# Patient Record
Sex: FEMALE | Race: WHITE | NOT HISPANIC OR LATINO | Employment: OTHER | URBAN - METROPOLITAN AREA
[De-identification: names, ages, dates, MRNs, and addresses within clinical notes are randomized per-mention and may not be internally consistent; named-entity substitution may affect disease eponyms.]

---

## 2017-07-20 ENCOUNTER — APPOINTMENT (OUTPATIENT)
Dept: CT IMAGING | Facility: HOSPITAL | Age: 82
End: 2017-07-20

## 2017-07-20 ENCOUNTER — HOSPITAL ENCOUNTER (INPATIENT)
Facility: HOSPITAL | Age: 82
LOS: 4 days | Discharge: NURSING, REHAB OR LONG-TERM CARE | End: 2017-07-24
Attending: EMERGENCY MEDICINE | Admitting: INTERNAL MEDICINE

## 2017-07-20 ENCOUNTER — APPOINTMENT (OUTPATIENT)
Dept: GENERAL RADIOLOGY | Facility: HOSPITAL | Age: 82
End: 2017-07-20

## 2017-07-20 DIAGNOSIS — R11.2 NAUSEA AND VOMITING, INTRACTABILITY OF VOMITING NOT SPECIFIED, UNSPECIFIED VOMITING TYPE: Primary | ICD-10-CM

## 2017-07-20 DIAGNOSIS — R26.2 DIFFICULTY WALKING: ICD-10-CM

## 2017-07-20 DIAGNOSIS — K52.9 COLITIS: ICD-10-CM

## 2017-07-20 LAB
ALBUMIN SERPL-MCNC: 4.5 G/DL (ref 3.5–5.2)
ALBUMIN/GLOB SERPL: 1.4 G/DL
ALP SERPL-CCNC: 89 U/L (ref 39–117)
ALT SERPL W P-5'-P-CCNC: 14 U/L (ref 1–33)
ANION GAP SERPL CALCULATED.3IONS-SCNC: 14.5 MMOL/L
AST SERPL-CCNC: 17 U/L (ref 1–32)
BACTERIA UR QL AUTO: ABNORMAL /HPF
BASOPHILS # BLD AUTO: 0.01 10*3/MM3 (ref 0–0.2)
BASOPHILS NFR BLD AUTO: 0.1 % (ref 0–1.5)
BILIRUB SERPL-MCNC: 0.6 MG/DL (ref 0.1–1.2)
BILIRUB UR QL STRIP: NEGATIVE
BUN BLD-MCNC: 17 MG/DL (ref 8–23)
BUN/CREAT SERPL: 20.5 (ref 7–25)
CALCIUM SPEC-SCNC: 9.6 MG/DL (ref 8.2–9.6)
CHLORIDE SERPL-SCNC: 93 MMOL/L (ref 98–107)
CLARITY UR: CLEAR
CO2 SERPL-SCNC: 30.5 MMOL/L (ref 22–29)
COLOR UR: YELLOW
CREAT BLD-MCNC: 0.83 MG/DL (ref 0.57–1)
D-LACTATE SERPL-SCNC: 1.7 MMOL/L (ref 0.5–2)
DEPRECATED RDW RBC AUTO: 46.1 FL (ref 37–54)
EOSINOPHIL # BLD AUTO: 0.01 10*3/MM3 (ref 0–0.7)
EOSINOPHIL NFR BLD AUTO: 0.1 % (ref 0.3–6.2)
ERYTHROCYTE [DISTWIDTH] IN BLOOD BY AUTOMATED COUNT: 13.3 % (ref 11.7–13)
GFR SERPL CREATININE-BSD FRML MDRD: 64 ML/MIN/1.73
GLOBULIN UR ELPH-MCNC: 3.3 GM/DL
GLUCOSE BLD-MCNC: 195 MG/DL (ref 65–99)
GLUCOSE UR STRIP-MCNC: NEGATIVE MG/DL
HCT VFR BLD AUTO: 44.8 % (ref 35.6–45.5)
HGB BLD-MCNC: 15.1 G/DL (ref 11.9–15.5)
HGB UR QL STRIP.AUTO: NEGATIVE
HOLD SPECIMEN: NORMAL
HOLD SPECIMEN: NORMAL
HYALINE CASTS UR QL AUTO: ABNORMAL /LPF
IMM GRANULOCYTES # BLD: 0.02 10*3/MM3 (ref 0–0.03)
IMM GRANULOCYTES NFR BLD: 0.2 % (ref 0–0.5)
KETONES UR QL STRIP: ABNORMAL
LEUKOCYTE ESTERASE UR QL STRIP.AUTO: ABNORMAL
LYMPHOCYTES # BLD AUTO: 0.5 10*3/MM3 (ref 0.9–4.8)
LYMPHOCYTES NFR BLD AUTO: 4.2 % (ref 19.6–45.3)
MCH RBC QN AUTO: 32 PG (ref 26.9–32)
MCHC RBC AUTO-ENTMCNC: 33.7 G/DL (ref 32.4–36.3)
MCV RBC AUTO: 94.9 FL (ref 80.5–98.2)
MONOCYTES # BLD AUTO: 0.38 10*3/MM3 (ref 0.2–1.2)
MONOCYTES NFR BLD AUTO: 3.2 % (ref 5–12)
NEUTROPHILS # BLD AUTO: 11.12 10*3/MM3 (ref 1.9–8.1)
NEUTROPHILS NFR BLD AUTO: 92.2 % (ref 42.7–76)
NITRITE UR QL STRIP: NEGATIVE
PH UR STRIP.AUTO: 5.5 [PH] (ref 5–8)
PLATELET # BLD AUTO: 186 10*3/MM3 (ref 140–500)
PMV BLD AUTO: 10.1 FL (ref 6–12)
POTASSIUM BLD-SCNC: 3.3 MMOL/L (ref 3.5–5.2)
PROT SERPL-MCNC: 7.8 G/DL (ref 6–8.5)
PROT UR QL STRIP: NEGATIVE
RBC # BLD AUTO: 4.72 10*6/MM3 (ref 3.9–5.2)
RBC # UR: ABNORMAL /HPF
REF LAB TEST METHOD: ABNORMAL
SODIUM BLD-SCNC: 138 MMOL/L (ref 136–145)
SP GR UR STRIP: 1.02 (ref 1–1.03)
SQUAMOUS #/AREA URNS HPF: ABNORMAL /HPF
UROBILINOGEN UR QL STRIP: ABNORMAL
WBC NRBC COR # BLD: 12.04 10*3/MM3 (ref 4.5–10.7)
WBC UR QL AUTO: ABNORMAL /HPF
WHOLE BLOOD HOLD SPECIMEN: NORMAL
WHOLE BLOOD HOLD SPECIMEN: NORMAL

## 2017-07-20 PROCEDURE — 80053 COMPREHEN METABOLIC PANEL: CPT | Performed by: EMERGENCY MEDICINE

## 2017-07-20 PROCEDURE — 87086 URINE CULTURE/COLONY COUNT: CPT | Performed by: EMERGENCY MEDICINE

## 2017-07-20 PROCEDURE — 71020 HC CHEST PA AND LATERAL: CPT

## 2017-07-20 PROCEDURE — 85025 COMPLETE CBC W/AUTO DIFF WBC: CPT | Performed by: EMERGENCY MEDICINE

## 2017-07-20 PROCEDURE — 87040 BLOOD CULTURE FOR BACTERIA: CPT | Performed by: EMERGENCY MEDICINE

## 2017-07-20 PROCEDURE — 81001 URINALYSIS AUTO W/SCOPE: CPT | Performed by: EMERGENCY MEDICINE

## 2017-07-20 PROCEDURE — 0 IOPAMIDOL 61 % SOLUTION: Performed by: EMERGENCY MEDICINE

## 2017-07-20 PROCEDURE — 99285 EMERGENCY DEPT VISIT HI MDM: CPT

## 2017-07-20 PROCEDURE — 83605 ASSAY OF LACTIC ACID: CPT | Performed by: EMERGENCY MEDICINE

## 2017-07-20 PROCEDURE — 74177 CT ABD & PELVIS W/CONTRAST: CPT

## 2017-07-20 RX ORDER — POTASSIUM CHLORIDE 7.45 MG/ML
10 INJECTION INTRAVENOUS
Status: DISCONTINUED | OUTPATIENT
Start: 2017-07-20 | End: 2017-07-24 | Stop reason: HOSPADM

## 2017-07-20 RX ORDER — SODIUM CHLORIDE 0.9 % (FLUSH) 0.9 %
10 SYRINGE (ML) INJECTION AS NEEDED
Status: DISCONTINUED | OUTPATIENT
Start: 2017-07-20 | End: 2017-07-21

## 2017-07-20 RX ORDER — LEVOFLOXACIN 5 MG/ML
500 INJECTION, SOLUTION INTRAVENOUS ONCE
Status: DISCONTINUED | OUTPATIENT
Start: 2017-07-20 | End: 2017-07-21 | Stop reason: DRUGHIGH

## 2017-07-20 RX ORDER — POTASSIUM CHLORIDE 750 MG/1
40 CAPSULE, EXTENDED RELEASE ORAL AS NEEDED
Status: DISCONTINUED | OUTPATIENT
Start: 2017-07-20 | End: 2017-07-24 | Stop reason: HOSPADM

## 2017-07-20 RX ORDER — POTASSIUM CHLORIDE 1.5 G/1.77G
40 POWDER, FOR SOLUTION ORAL AS NEEDED
Status: DISCONTINUED | OUTPATIENT
Start: 2017-07-20 | End: 2017-07-24 | Stop reason: HOSPADM

## 2017-07-20 RX ADMIN — POTASSIUM CHLORIDE 40 MEQ: 1.5 POWDER, FOR SOLUTION ORAL at 23:23

## 2017-07-20 RX ADMIN — METRONIDAZOLE 500 MG: 500 INJECTION, SOLUTION INTRAVENOUS at 23:26

## 2017-07-20 RX ADMIN — IOPAMIDOL 85 ML: 612 INJECTION, SOLUTION INTRAVENOUS at 22:01

## 2017-07-21 PROBLEM — E43 SEVERE MALNUTRITION (HCC): Status: ACTIVE | Noted: 2017-07-21

## 2017-07-21 PROBLEM — R63.6 UNDERWEIGHT: Status: ACTIVE | Noted: 2017-07-21

## 2017-07-21 LAB
ANION GAP SERPL CALCULATED.3IONS-SCNC: 14.2 MMOL/L
BASOPHILS # BLD AUTO: 0.01 10*3/MM3 (ref 0–0.2)
BASOPHILS NFR BLD AUTO: 0.1 % (ref 0–1.5)
BUN BLD-MCNC: 17 MG/DL (ref 8–23)
BUN/CREAT SERPL: 20.7 (ref 7–25)
CALCIUM SPEC-SCNC: 9.2 MG/DL (ref 8.2–9.6)
CHLORIDE SERPL-SCNC: 97 MMOL/L (ref 98–107)
CO2 SERPL-SCNC: 23.8 MMOL/L (ref 22–29)
CREAT BLD-MCNC: 0.82 MG/DL (ref 0.57–1)
DEPRECATED RDW RBC AUTO: 47.1 FL (ref 37–54)
EOSINOPHIL # BLD AUTO: 0 10*3/MM3 (ref 0–0.7)
EOSINOPHIL NFR BLD AUTO: 0 % (ref 0.3–6.2)
ERYTHROCYTE [DISTWIDTH] IN BLOOD BY AUTOMATED COUNT: 13.7 % (ref 11.7–13)
GFR SERPL CREATININE-BSD FRML MDRD: 65 ML/MIN/1.73
GLUCOSE BLD-MCNC: 122 MG/DL (ref 65–99)
HCT VFR BLD AUTO: 40.1 % (ref 35.6–45.5)
HGB BLD-MCNC: 13.2 G/DL (ref 11.9–15.5)
IMM GRANULOCYTES # BLD: 0.02 10*3/MM3 (ref 0–0.03)
IMM GRANULOCYTES NFR BLD: 0.2 % (ref 0–0.5)
LYMPHOCYTES # BLD AUTO: 0.88 10*3/MM3 (ref 0.9–4.8)
LYMPHOCYTES NFR BLD AUTO: 9.8 % (ref 19.6–45.3)
MCH RBC QN AUTO: 31.1 PG (ref 26.9–32)
MCHC RBC AUTO-ENTMCNC: 32.9 G/DL (ref 32.4–36.3)
MCV RBC AUTO: 94.6 FL (ref 80.5–98.2)
MONOCYTES # BLD AUTO: 0.72 10*3/MM3 (ref 0.2–1.2)
MONOCYTES NFR BLD AUTO: 8 % (ref 5–12)
NEUTROPHILS # BLD AUTO: 7.39 10*3/MM3 (ref 1.9–8.1)
NEUTROPHILS NFR BLD AUTO: 81.9 % (ref 42.7–76)
PLATELET # BLD AUTO: 216 10*3/MM3 (ref 140–500)
PMV BLD AUTO: 9.6 FL (ref 6–12)
POTASSIUM BLD-SCNC: 4.5 MMOL/L (ref 3.5–5.2)
POTASSIUM BLD-SCNC: 4.5 MMOL/L (ref 3.5–5.2)
RBC # BLD AUTO: 4.24 10*6/MM3 (ref 3.9–5.2)
SODIUM BLD-SCNC: 135 MMOL/L (ref 136–145)
WBC NRBC COR # BLD: 9.02 10*3/MM3 (ref 4.5–10.7)

## 2017-07-21 PROCEDURE — 25010000002 ONDANSETRON PER 1 MG: Performed by: INTERNAL MEDICINE

## 2017-07-21 PROCEDURE — 84132 ASSAY OF SERUM POTASSIUM: CPT | Performed by: INTERNAL MEDICINE

## 2017-07-21 PROCEDURE — 87040 BLOOD CULTURE FOR BACTERIA: CPT | Performed by: EMERGENCY MEDICINE

## 2017-07-21 PROCEDURE — 25010000002 HYDRALAZINE PER 20 MG: Performed by: INTERNAL MEDICINE

## 2017-07-21 PROCEDURE — 93010 ELECTROCARDIOGRAM REPORT: CPT | Performed by: INTERNAL MEDICINE

## 2017-07-21 PROCEDURE — 25010000002 PROMETHAZINE PER 50 MG: Performed by: HOSPITALIST

## 2017-07-21 PROCEDURE — 25010000002 LEVOFLOXACIN PER 250 MG: Performed by: INTERNAL MEDICINE

## 2017-07-21 PROCEDURE — 80048 BASIC METABOLIC PNL TOTAL CA: CPT | Performed by: INTERNAL MEDICINE

## 2017-07-21 PROCEDURE — 93005 ELECTROCARDIOGRAM TRACING: CPT | Performed by: HOSPITALIST

## 2017-07-21 PROCEDURE — 85025 COMPLETE CBC W/AUTO DIFF WBC: CPT | Performed by: INTERNAL MEDICINE

## 2017-07-21 RX ORDER — SODIUM CHLORIDE 0.9 % (FLUSH) 0.9 %
1-10 SYRINGE (ML) INJECTION AS NEEDED
Status: DISCONTINUED | OUTPATIENT
Start: 2017-07-21 | End: 2017-07-21

## 2017-07-21 RX ORDER — ONDANSETRON 4 MG/1
4 TABLET, FILM COATED ORAL EVERY 6 HOURS PRN
Status: DISCONTINUED | OUTPATIENT
Start: 2017-07-21 | End: 2017-07-23

## 2017-07-21 RX ORDER — ATENOLOL 25 MG/1
25 TABLET ORAL 2 TIMES DAILY
COMMUNITY
End: 2017-07-24 | Stop reason: HOSPADM

## 2017-07-21 RX ORDER — DONEPEZIL HYDROCHLORIDE 5 MG/1
5 TABLET, FILM COATED ORAL NIGHTLY
Status: DISCONTINUED | OUTPATIENT
Start: 2017-07-21 | End: 2017-07-24 | Stop reason: HOSPADM

## 2017-07-21 RX ORDER — SODIUM CHLORIDE 9 MG/ML
100 INJECTION, SOLUTION INTRAVENOUS CONTINUOUS
Status: DISCONTINUED | OUTPATIENT
Start: 2017-07-21 | End: 2017-07-22

## 2017-07-21 RX ORDER — ASPIRIN 81 MG/1
81 TABLET, CHEWABLE ORAL DAILY PRN
COMMUNITY

## 2017-07-21 RX ORDER — ISOSORBIDE MONONITRATE 30 MG/1
30 TABLET, EXTENDED RELEASE ORAL DAILY
Status: DISCONTINUED | OUTPATIENT
Start: 2017-07-21 | End: 2017-07-24 | Stop reason: HOSPADM

## 2017-07-21 RX ORDER — ONDANSETRON 4 MG/1
4 TABLET, ORALLY DISINTEGRATING ORAL EVERY 6 HOURS PRN
Status: DISCONTINUED | OUTPATIENT
Start: 2017-07-21 | End: 2017-07-23

## 2017-07-21 RX ORDER — LEVOFLOXACIN 5 MG/ML
750 INJECTION, SOLUTION INTRAVENOUS EVERY 24 HOURS
Status: DISCONTINUED | OUTPATIENT
Start: 2017-07-21 | End: 2017-07-21

## 2017-07-21 RX ORDER — ATORVASTATIN CALCIUM 10 MG/1
10 TABLET, FILM COATED ORAL DAILY
Status: DISCONTINUED | OUTPATIENT
Start: 2017-07-21 | End: 2017-07-21

## 2017-07-21 RX ORDER — LEVOFLOXACIN 5 MG/ML
750 INJECTION, SOLUTION INTRAVENOUS
Status: DISCONTINUED | OUTPATIENT
Start: 2017-07-23 | End: 2017-07-22

## 2017-07-21 RX ORDER — ATENOLOL 25 MG/1
25 TABLET ORAL 2 TIMES DAILY
Status: DISCONTINUED | OUTPATIENT
Start: 2017-07-21 | End: 2017-07-24

## 2017-07-21 RX ORDER — DONEPEZIL HYDROCHLORIDE 5 MG/1
5 TABLET, FILM COATED ORAL NIGHTLY
COMMUNITY

## 2017-07-21 RX ORDER — PROMETHAZINE HYDROCHLORIDE 25 MG/ML
6.25 INJECTION, SOLUTION INTRAMUSCULAR; INTRAVENOUS ONCE
Status: COMPLETED | OUTPATIENT
Start: 2017-07-21 | End: 2017-07-21

## 2017-07-21 RX ORDER — ISOSORBIDE MONONITRATE 30 MG/1
30 TABLET, EXTENDED RELEASE ORAL DAILY
COMMUNITY

## 2017-07-21 RX ORDER — ONDANSETRON 2 MG/ML
4 INJECTION INTRAMUSCULAR; INTRAVENOUS EVERY 6 HOURS PRN
Status: DISCONTINUED | OUTPATIENT
Start: 2017-07-21 | End: 2017-07-23

## 2017-07-21 RX ORDER — HYDRALAZINE HYDROCHLORIDE 20 MG/ML
10 INJECTION INTRAMUSCULAR; INTRAVENOUS EVERY 6 HOURS PRN
Status: DISCONTINUED | OUTPATIENT
Start: 2017-07-21 | End: 2017-07-24 | Stop reason: HOSPADM

## 2017-07-21 RX ADMIN — SODIUM CHLORIDE 100 ML/HR: 9 INJECTION, SOLUTION INTRAVENOUS at 01:00

## 2017-07-21 RX ADMIN — PROMETHAZINE HYDROCHLORIDE 6.25 MG: 25 INJECTION INTRAMUSCULAR; INTRAVENOUS at 11:44

## 2017-07-21 RX ADMIN — DONEPEZIL HYDROCHLORIDE 5 MG: 5 TABLET, FILM COATED ORAL at 20:56

## 2017-07-21 RX ADMIN — ONDANSETRON 4 MG: 2 INJECTION INTRAMUSCULAR; INTRAVENOUS at 03:26

## 2017-07-21 RX ADMIN — POTASSIUM CHLORIDE 40 MEQ: 1.5 POWDER, FOR SOLUTION ORAL at 04:15

## 2017-07-21 RX ADMIN — SODIUM CHLORIDE 100 ML/HR: 9 INJECTION, SOLUTION INTRAVENOUS at 14:24

## 2017-07-21 RX ADMIN — LEVOFLOXACIN 750 MG: 5 INJECTION, SOLUTION INTRAVENOUS at 02:23

## 2017-07-21 RX ADMIN — METRONIDAZOLE 500 MG: 500 INJECTION, SOLUTION INTRAVENOUS at 06:07

## 2017-07-21 RX ADMIN — HYDRALAZINE HYDROCHLORIDE 10 MG: 20 INJECTION INTRAMUSCULAR; INTRAVENOUS at 04:45

## 2017-07-21 RX ADMIN — ONDANSETRON 4 MG: 2 INJECTION INTRAMUSCULAR; INTRAVENOUS at 08:36

## 2017-07-21 RX ADMIN — APIXABAN 2.5 MG: 2.5 TABLET, FILM COATED ORAL at 20:56

## 2017-07-21 RX ADMIN — METRONIDAZOLE 500 MG: 500 INJECTION, SOLUTION INTRAVENOUS at 14:24

## 2017-07-21 RX ADMIN — METRONIDAZOLE 500 MG: 500 INJECTION, SOLUTION INTRAVENOUS at 22:29

## 2017-07-21 NOTE — PROGRESS NOTES
Continued Stay Note  [facility]     Patient Name: Audrey Roblero  MRN: [id number]  Today's Date: 7/21/2017    Admit Date: 7/20/2017          Discharge Plan       07/21/17 1408    Case Management/Social Work Plan    Additional Comments Attempted to call Yanira Baxter, 1st emergency contact and dtr, Becky Bernstein and left messages for both.  Explained IMM on message and explained that I will leave a copy in the room.  Rell, RN, CCP              Discharge Codes     None            Denise Maldonado, RN

## 2017-07-21 NOTE — PROGRESS NOTES
Name: Audrey Roblero ADMIT: 2017   : 6/3/1920  PCP: Rob Torre Jr., MD    MRN: [id number] LOS: 1 days   AGE/SEX: 89 y.o. female  ROOM: [facility]     Subjective   Subjective  Sickly appearing.  Pleasantly confused.  Dry heaves earlier.  Not eating much.    Objective   Vital Signs  Temp:  [94.9 °F (34.9 °C)-98 °F (36.7 °C)] 98 °F (36.7 °C)  Heart Rate:  [51-91] 82  Resp:  [14-20] 14  BP: (117-213)/(60-88) 117/80  Body mass index is 15.72 kg/(m^2).    Physical Exam   Constitutional: She is cooperative. She appears ill. No distress.   Elderly, frail, poor muscle mass   Neck: No JVD present. No tracheal deviation present. No thyromegaly present.   Cardiovascular: Normal rate and regular rhythm.    No murmur heard.  Pulmonary/Chest: Effort normal and breath sounds normal. No respiratory distress.   Abdominal: Soft. Normal appearance and bowel sounds are normal. She exhibits no distension. There is no tenderness (mild diffuse). There is no rebound and no guarding.   Neurological: She is alert.   Pleasantly confused   Skin: Skin is warm and dry. No rash noted.   Nursing note and vitals reviewed.      Results Review:       I reviewed the patient's new clinical results.    Results from last 7 days  Lab Units 17  0819 17  211   WBC 10*3/mm3 9.02 12.04*   HEMOGLOBIN g/dL 13.2 15.1   PLATELETS 10*3/mm3 216 186       Results from last 7 days  Lab Units 17  0819 17  2115   SODIUM mmol/L 135* 138   POTASSIUM mmol/L 4.5  4.5 3.3*   CHLORIDE mmol/L 97* 93*   CO2 mmol/L 23.8 30.5*   BUN mg/dL 17 17   CREATININE mg/dL 0.82 0.83   GLUCOSE mg/dL 122* 195*   Estimated Creatinine Clearance: 30.8 mL/min (by C-G formula based on Cr of 0.82).    Results from last 7 days  Lab Units 17  0819 17  211   CALCIUM mg/dL 9.2 9.6   ALBUMIN g/dL  --  4.50       Results from last 7 days  Lab Units 17  2115   LACTATE mmol/L 1.7       Results from last 7 days  Lab Units 17  0056  07/20/17  2115   BLOODCX  No growth at less than 24 hours No growth at less than 24 hours           CT SCAN ABDOMEN AND PELVIS  7/20/2017  1.  Findings of moderately severe colitis of the ascending colon. No  obstruction, perforation or abscess.  2.  Chronic changes of the sigmoid colon suggesting prior episodes of  inflammation. Diverticulosis of the colon.   3.  Fatty infiltration of the liver. Renal cysts measuring up to 1 cm.      apixaban 2.5 mg Oral Q12H   atenolol 25 mg Oral BID   atorvastatin 10 mg Oral Daily   donepezil 5 mg Oral Nightly   isosorbide mononitrate 30 mg Oral Daily   [START ON 7/23/2017] levoFLOXacin 750 mg Intravenous Q48H   metroNIDAZOLE 500 mg Intravenous Q8H       sodium chloride 100 mL/hr Last Rate: 100 mL/hr (07/21/17 1424)         Assessment/Plan   Assessment:     Active Hospital Problems (** Indicates Principal Problem)    Diagnosis Date Noted   • **Colitis [K52.9] 07/20/2017   • Underweight BMI 15.7 [R63.6] 07/21/2017   • Nausea and vomiting [R11.2] 07/20/2017   • Hypokalemia [E87.6] 11/01/2016   • Atrial fibrillation [I48.91] 10/31/2016   • Chronic diastolic heart failure [I50.32] 10/31/2016      Resolved Hospital Problems    Diagnosis Date Noted Date Resolved   No resolved problems to display.       Plan:   - LEVAQUIN and FLAGYL IV to cover GNRs and anaerobes  - NS @ 100 cc/hr  - Monitor QT  - Nausea control with IV ZOFRAN as needed.  - Monitor vital signs and pain  - DNR      Phi Terrazas MD  [facility]  07/21/17  2:35 PM

## 2017-07-21 NOTE — PLAN OF CARE
Problem: Patient Care Overview (Adult)  Goal: Plan of Care Review  Outcome: Ongoing (interventions implemented as appropriate)    07/21/17 1751   Coping/Psychosocial Response Interventions   Plan Of Care Reviewed With patient;daughter;caregiver   Outcome Evaluation   Outcome Summary/Follow up Plan VSS. no acute events. ongoing nausea (no relief w/ zofran); relief w/ phenergan and was able to eat l10% dinner. no stool sample attained to cx; paroxysmal afib continues on tele. new IV . NS @ 100ml/hr. update given to daughter Becky and caregiver Lily.          Problem: Fall Risk (Adult)  Goal: Absence of Falls  Outcome: Ongoing (interventions implemented as appropriate)    07/21/17 1751   Fall Risk (Adult)   Absence of Falls making progress toward outcome         Problem: Infection, Risk/Actual (Adult)  Goal: Infection Prevention/Resolution  Outcome: Ongoing (interventions implemented as appropriate)    07/21/17 1751   Infection, Risk/Actual (Adult)   Infection Prevention/Resolution making progress toward outcome         Problem: Skin Integrity Impairment, Risk/Actual (Adult)  Goal: Skin Integrity/Wound Healing  Outcome: Ongoing (interventions implemented as appropriate)    07/21/17 1751   Skin Integrity Impairment, Risk/Actual (Adult)   Skin Integrity/Wound Healing making progress toward outcome         Problem: Nutrition, Imbalanced: Inadequate Oral Intake (Adult)  Goal: Identify Related Risk Factors and Signs and Symptoms  Outcome: Outcome(s) achieved Date Met:  07/21/17 07/21/17 1751   Nutrition, Imbalanced: Inadequate Oral Intake   Nutrition Imbalanced: Less than Body Requirements: Related Risk Factors appetite decreased;chronic illness/infection;satiety early   Signs and Symptoms (Nutrition Imbalance, Inadequate Oral Intake: Signs and Symptoms) satiety early;irritability/confusion;muscle mass/strength decreased;loss of subcutaneous fat;nausea and vomiting;weakness/lethargy;weight decreased (percent weight  loss, percent usual body weight, body mass index less than 18.5) (Adults)       Goal: Improved Oral Intake  Outcome: Ongoing (interventions implemented as appropriate)    07/21/17 1751   Nutrition, Imbalanced: Inadequate Oral Intake (Adult)   Improved Oral Intake making progress toward outcome       Goal: Prevent Further Weight Loss  Outcome: Ongoing (interventions implemented as appropriate)    07/21/17 1751   Nutrition, Imbalanced: Inadequate Oral Intake (Adult)   Prevent Further Weight Loss making progress toward outcome

## 2017-07-21 NOTE — PROGRESS NOTES
Malnutrition Severity Assessment    Patient Name:  Audrey Roblero  YOB: 1920  MRN: [id number]  Admit Date:  7/20/2017    Patient meets criteria for : Severe malnutrition    Comments:  Meets criteria for severe malnutrition based on BMI, %IBW, poor intake.        Malnutrition Type: Chronic Illness Malnutrition     Malnutrition Type (last 8 hours)      Malnutrition Severity Assessment       07/21/17 1526    Malnutrition Severity Assessment    Malnutrition Type Chronic Illness Malnutrition          Weight Status         Most Recent Value    BMI  Severe (<16)    %IBW  Severe (<70%)      Energy Intake Status         Most Recent Value    Energy Intake  Mild (<75% / 5d)              Electronically signed by:  Myriam Garay RD  07/21/17 3:37 PM

## 2017-07-21 NOTE — PLAN OF CARE
Problem: Patient Care Overview (Adult)  Goal: Plan of Care Review  Outcome: Ongoing (interventions implemented as appropriate)    07/21/17 0627   Coping/Psychosocial Response Interventions   Plan Of Care Reviewed With patient   Patient Care Overview   Progress no change   Outcome Evaluation   Outcome Summary/Follow up Plan Transferred from [address] [place] [address], was an earlier admit from ER for colitis. Orders reviewed and executed. A-fib on monitor. Caregiver @ bedside. IVF + IV abx continued per orders. BP high on admit SBP > 170, was given PRN IV hydralazine x 1, BP came back down to 139/82. Patient constantly pleading that she wants to die and is ready to die. Call placed to on-call  for them to see patient early this a.m. TEDs and SCDs in place. IV zofran given x 1.        Goal: Adult Individualization and Mutuality  Outcome: Ongoing (interventions implemented as appropriate)  Goal: Discharge Needs Assessment  Outcome: Ongoing (interventions implemented as appropriate)    Problem: Fall Risk (Adult)  Goal: Identify Related Risk Factors and Signs and Symptoms  Outcome: Outcome(s) achieved Date Met:  07/21/17  Goal: Absence of Falls  Outcome: Ongoing (interventions implemented as appropriate)    Problem: Infection, Risk/Actual (Adult)  Goal: Identify Related Risk Factors and Signs and Symptoms  Outcome: Outcome(s) achieved Date Met:  07/21/17  Goal: Infection Prevention/Resolution  Outcome: Ongoing (interventions implemented as appropriate)    Problem: Skin Integrity Impairment, Risk/Actual (Adult)  Goal: Identify Related Risk Factors and Signs and Symptoms  Outcome: Outcome(s) achieved Date Met:  07/21/17  Goal: Skin Integrity/Wound Healing  Outcome: Ongoing (interventions implemented as appropriate)

## 2017-07-21 NOTE — PROGRESS NOTES
"Adult Nutrition  Assessment/PES    Patient Name:  Audrey Roblero  YOB: 1920  MRN: [id number]  Admit Date:  7/20/2017    Assessment Date:  7/21/2017        Reason for Assessment       07/21/17 1521    Reason for Assessment    Identified At Risk By Screening Criteria BMI    Diagnosis Diagnosis    Gastrointestinal Colitis    Neurological Dementia              Nutrition/Diet History       07/21/17 1529    Nutrition/Diet History    Typical Food/Fluid Intake patient telling nurse she wants to diet, ready to die. Per caregiver patient has not taken meds in 2 weeks, not eating well            Anthropometrics       07/21/17 1523    Anthropometrics    Height 177.8 cm (70\")    Weight 49.4 kg (109 lb)    Anthropometrics (Special Considerations)    RD Calculated     RD Calculated % IBW 72    RD Calculated BMI (kg/m2) 15.6    Ideal Body Weight (IBW)    Ideal Body Weight (IBW), Female 69.12    % Ideal Body Weight 71.68    % Ideal Body Weight Malnutrition less than 70% -severe deficit    Body Mass Index (BMI)    BMI (kg/m2) 15.67    BMI Grade less than 16 low grade III            Labs/Tests/Procedures/Meds       07/21/17 1525    Labs/Tests/Procedures/Meds    Medication Review Antibiotic            Physical Findings       07/21/17 1525    Physical Findings/Assessment    Additional Documentation Physical Appearance (Group)    Physical Appearance    Overall Physical Appearance underweight    Skin --   intact            Estimated/Assessed Needs       07/21/17 1525    Calculation Measurements    Weight Used For Calculations 49.4 kg (109 lb)    Height Used for Calculations 1.778 m (5' 10\")    Estimated/Assessed Energy Needs    Energy Need Method Kcal/kg    kcal/kg 35    35 Kcal/Kg (kcal) 1730.47    Estimated Kcal Range  3743-9464    Estimated/Assessed Protein Needs    Weight Used for Protein Calculation 49.4 kg (109 lb)    Protein (gm/kg) 1.2    1.2 Gm Protein (gm) 59.33    Estimated/Assessed Fluid Needs    Fluid " Need Method RDA method    RDA Method (mL)  1750            Nutrition Prescription Ordered       07/21/17 1526    Nutrition Prescription PO    Current PO Diet Regular            Evaluation of Received Nutrient/Fluid Intake       07/21/17 1526    PO Evaluation    Number of Meals 2    % PO Intake 0              Malnutrition Severity Assessment       07/21/17 1526    Malnutrition Severity Assessment    Malnutrition Type Chronic Illness Malnutrition    Weight Status (Chronic)    BMI Severe (<16)    %IBW Severe (<70%)    Energy Intake Status (Chronic)    Energy Intake Mild (<75% / 5d)    Criteria Met (Must meet criteria for severity in at least 2 of these categories: M Wasting, Fat Loss, Fluid, Secondary Signs, Wt. Status, Intake)    Patient meets criteria for  Severe malnutrition          Problem/Interventions:        Problem 1       07/21/17 1528    Nutrition Diagnoses Problem 1    Problem 1 Underweight    Etiology (related to) Factors Affecting Nutrition    Appetite Poor    Signs/Symptoms (evidenced by) BMI;% IBW;Report of Mnimal PO Intake    BMI Less than 16    Percent (%) IBW 71 %                    Intervention Goal       07/21/17 1529    Intervention Goal    PO Tolerate PO                Education/Evaluation       07/21/17 1529    Monitor/Evaluation    Monitor Per protocol;PO intake;Symptoms        Comments:  Assessment completed.  No po intake, very nauseated. Will follow.    Electronically signed by:  Myriam Garay RD  07/21/17 3:38 PM

## 2017-07-21 NOTE — H&P
[facility] Admission H&P    Patient Care Team:  Rob Torre Jr., MD as PCP - General (Internal Medicine)    Chief complaint: Nausea, vomiting, diarrhea    History of Present Illness    This is a 89-year-old white female with history of dementia, atrial fibrillation, diastolic congestive heart failure, coronary artery disease who presented to the emergency room today with nausea, vomiting, and diarrhea.  She lives alone and has home instead services that come to see her.  Her caregiver came to her house this evening and discovered a shunt laying in bed in her own feces and thus brought her to the emergency room.  Evidently the patient has been having intermittent abdominal cramping over the past couple of weeks and some isolated episodes of diarrhea but it is much worse now per her caregiver.  The patient has not taken any of her medications in the last 2-3 weeks.  She has been more confused over the past 2 days.  Upon evaluation in the emergency room she was found to have acute colitis.  Unfortunately, I'm not able to get much more information even the patient's dementia.    Past Medical History:   Diagnosis Date   • Atrial fibrillation    • CAD (coronary artery disease)    • CHF (congestive heart failure)    • Diastolic heart failure    • Hyperlipidemia    • Hypertension    • Mitral regurgitation    • SOB (shortness of breath)      Past Surgical History:   Procedure Laterality Date   • APPENDECTOMY     • CORONARY ANGIOPLASTY WITH STENT PLACEMENT  2008    Dr. Gupta   • TONSILLECTOMY       History reviewed. No pertinent family history.  Social History   Substance Use Topics   • Smoking status: Former Smoker   • Smokeless tobacco: None   • Alcohol use None     Medications reviewed    Allergies:  Review of patient's allergies indicates no known allergies.    Review of Systems   Unable to perform ROS: Dementia        PHYSICAL EXAM    Vital Signs  tMax 24 hrs:  Temp (24hrs), Av.5 °F (35.3 °C), Min:94.9 °F  (34.9 °C), Max:96.1 °F (35.6 °C)    Vitals Ranges:  Temp:  [94.9 °F (34.9 °C)-96.1 °F (35.6 °C)] 96.1 °F (35.6 °C)  Heart Rate:  [51-91] 91  Resp:  [18-20] 18  BP: (169-213)/(63-88) 169/88    Physical Exam   Constitutional: She appears well-developed and well-nourished. No distress.   Elderly and frail-appearing   HENT:   Head: Normocephalic and atraumatic.   Eyes: EOM are normal. Pupils are equal, round, and reactive to light.   Neck: Neck supple. No tracheal deviation present.   Cardiovascular: Normal rate and regular rhythm.  Exam reveals no gallop.    No murmur heard.  Pulmonary/Chest: Effort normal. No respiratory distress. She has no wheezes.   Abdominal: Soft. Bowel sounds are normal. She exhibits no distension. There is no tenderness.   Musculoskeletal: She exhibits edema. She exhibits no tenderness.   Neurological: She is alert. No cranial nerve deficit.   Oriented only to person.   Skin: Skin is warm and dry.   Nursing note and vitals reviewed.      Results Review:    Results from last 7 days  Lab Units 07/20/17  2115   WBC 10*3/mm3 12.04*   HEMOGLOBIN g/dL 15.1   HEMATOCRIT % 44.8   PLATELETS 10*3/mm3 186       Results from last 7 days  Lab Units 07/20/17  2115   SODIUM mmol/L 138   POTASSIUM mmol/L 3.3*   CHLORIDE mmol/L 93*   CO2 mmol/L 30.5*   BUN mg/dL 17   CREATININE mg/dL 0.83   CALCIUM mg/dL 9.6   BILIRUBIN mg/dL 0.6   ALK PHOS U/L 89   ALT (SGPT) U/L 14   AST (SGOT) U/L 17   GLUCOSE mg/dL 195*     Urinalysis shows 21-30 white blood cells but no bacteria.    Lactic acid 1.7    CT scan of the abdomen and pelvis:  1.  Findings of moderately severe colitis of the ascending colon. No  obstruction, perforation or abscess.  2.  Chronic changes of the sigmoid colon suggesting prior episodes of  inflammation. Diverticulosis of the colon.   3.  Fatty infiltration of the liver. Renal cysts measuring up to 1 cm.     I reviewed the patient's new clinical results.  I reviewed the patient's new imaging results  and agree with the interpretation.      Principal Problem:    Colitis  Active Problems:    Diastolic heart failure    A-fib    Hypokalemia    Nausea and vomiting      Assessment & Plan    The patient will be started on Levaquin and Flagyl to treat her colitis.  It does not sound as if she has any recent antibiotic exposure but will go ahead and check C. difficile and stool culture.  Replace potassium.  She does appear to be jumping in and out of atrial fibrillation with a rapid rate at time but this only lasts for brief intervals.  Evidently she has been off of all of her medications for the last 3 weeks.  I do not have a complete list yet but these will be reviewed and restarted as seen appropriate when a full list is available.  We'll provide gentle IV fluids and anti-medics.  Additional plans based on her clinical course.  I did discuss with her son over the phone.  He indicates that she is a conditional code with exceptions of antibiotics and blood products.    I discussed the patients findings and my recommendations with patient and family    Eliud Cook MD  07/20/17  11:07 PM

## 2017-07-21 NOTE — ED PROVIDER NOTES
EMERGENCY DEPARTMENT ENCOUNTER       CHIEF COMPLAINT  Chief Complaint: Vomiting  History given by: Patient, Caretaker  History limited by: Dementia  Room Number: 17/17  PMD: Rob Torre Jr., MD      HPI:  HPI Comments: Per caretaker, pt has h/o dementia. Pt was found by her caretaker earlier today vomiting, incontinent of stool, and complaining of dizziness. Pt does not specify the characteristics of her dizziness. Per caretaker, pt is currently at her baseline mental status. There are no other complaints at this time.     Patient is a 89 y.o. female presenting with vomiting.   Vomiting   The primary symptoms include nausea and vomiting. The illness began today. The onset was gradual. Progression since onset: waxing and waning          PAST MEDICAL HISTORY  Active Ambulatory Problems     Diagnosis Date Noted   • Diastolic heart failure 10/31/2016   • New onset a-fib 10/31/2016   • Hypokalemia 11/01/2016   • MR (mitral regurgitation) 11/01/2016     Resolved Ambulatory Problems     Diagnosis Date Noted   • Pneumonia of right lower lobe due to infectious organism 10/29/2016     Past Medical History:   Diagnosis Date   • Atrial fibrillation    • CAD (coronary artery disease)    • CHF (congestive heart failure)    • Diastolic heart failure    • Hyperlipidemia    • Hypertension    • Mitral regurgitation    • SOB (shortness of breath)          PAST SURGICAL HISTORY  Past Surgical History:   Procedure Laterality Date   • APPENDECTOMY     • CORONARY ANGIOPLASTY WITH STENT PLACEMENT  05/23/2008    Dr. Gupta   • TONSILLECTOMY           FAMILY HISTORY  History reviewed. No pertinent family history.      SOCIAL HISTORY  Social History     Social History   • Marital status:      Spouse name: N/A   • Number of children: N/A   • Years of education: N/A     Occupational History   • Not on file.     Social History Main Topics   • Smoking status: Former Smoker   • Smokeless tobacco: Not on file   • Alcohol use Not on  file   • Drug use: Not on file   • Sexual activity: Defer     Other Topics Concern   • Not on file     Social History Narrative         ALLERGIES  Review of patient's allergies indicates no known allergies.        REVIEW OF SYSTEMS  Review of Systems   Unable to perform ROS: Dementia   Gastrointestinal: Positive for nausea and vomiting.   Genitourinary:        Bowel incontinence   Neurological: Positive for dizziness.            PHYSICAL EXAM  ED Triage Vitals   Temp Heart Rate Resp BP SpO2   07/20/17 2033 07/20/17 2033 07/20/17 2033 07/20/17 2033 07/20/17 2033   94.9 °F (34.9 °C) 55 20 213/63 97 % WNL      Temp src Heart Rate Source Patient Position BP Location FiO2 (%)   07/20/17 2033 07/20/17 2033 -- -- --   Tympanic Monitor          Physical Exam   Constitutional: No distress.   Pt appears elderly and frail.   HENT:   Head: Normocephalic.   Mouth/Throat: Mucous membranes are dry.   Eyes: EOM are normal. Pupils are equal, round, and reactive to light.   Neck: Normal range of motion. Neck supple.   Cardiovascular: Normal rate, regular rhythm and normal heart sounds.    Pulmonary/Chest: Effort normal and breath sounds normal. No respiratory distress. She has no decreased breath sounds. She has no wheezes. She has no rhonchi. She has no rales.   Abdominal: Soft. There is no tenderness. There is no rebound and no guarding.   Musculoskeletal: Normal range of motion.   Neurological: She is alert. She has normal sensation.   Pt is pleasantly confused (pt has dementia).   Skin: Skin is warm and dry.   Psychiatric: Mood and affect normal.   Nursing note and vitals reviewed.          LAB RESULTS  Recent Results (from the past 24 hour(s))   Comprehensive Metabolic Panel    Collection Time: 07/20/17  9:15 PM   Result Value Ref Range    Glucose 195 (H) 65 - 99 mg/dL    BUN 17 8 - 23 mg/dL    Creatinine 0.83 0.57 - 1.00 mg/dL    Sodium 138 136 - 145 mmol/L    Potassium 3.3 (L) 3.5 - 5.2 mmol/L    Chloride 93 (L) 98 - 107  mmol/L    CO2 30.5 (H) 22.0 - 29.0 mmol/L    Calcium 9.6 8.2 - 9.6 mg/dL    Total Protein 7.8 6.0 - 8.5 g/dL    Albumin 4.50 3.50 - 5.20 g/dL    ALT (SGPT) 14 1 - 33 U/L    AST (SGOT) 17 1 - 32 U/L    Alkaline Phosphatase 89 39 - 117 U/L    Total Bilirubin 0.6 0.1 - 1.2 mg/dL    eGFR Non African Amer 64 >60 mL/min/1.73    Globulin 3.3 gm/dL    A/G Ratio 1.4 g/dL    BUN/Creatinine Ratio 20.5 7.0 - 25.0    Anion Gap 14.5 mmol/L   Lactic Acid, Plasma    Collection Time: 07/20/17  9:15 PM   Result Value Ref Range    Lactate 1.7 0.5 - 2.0 mmol/L   CBC Auto Differential    Collection Time: 07/20/17  9:15 PM   Result Value Ref Range    WBC 12.04 (H) 4.50 - 10.70 10*3/mm3    RBC 4.72 3.90 - 5.20 10*6/mm3    Hemoglobin 15.1 11.9 - 15.5 g/dL    Hematocrit 44.8 35.6 - 45.5 %    MCV 94.9 80.5 - 98.2 fL    MCH 32.0 26.9 - 32.0 pg    MCHC 33.7 32.4 - 36.3 g/dL    RDW 13.3 (H) 11.7 - 13.0 %    RDW-SD 46.1 37.0 - 54.0 fl    MPV 10.1 6.0 - 12.0 fL    Platelets 186 140 - 500 10*3/mm3    Neutrophil % 92.2 (H) 42.7 - 76.0 %    Lymphocyte % 4.2 (L) 19.6 - 45.3 %    Monocyte % 3.2 (L) 5.0 - 12.0 %    Eosinophil % 0.1 (L) 0.3 - 6.2 %    Basophil % 0.1 0.0 - 1.5 %    Immature Grans % 0.2 0.0 - 0.5 %    Neutrophils, Absolute 11.12 (H) 1.90 - 8.10 10*3/mm3    Lymphocytes, Absolute 0.50 (L) 0.90 - 4.80 10*3/mm3    Monocytes, Absolute 0.38 0.20 - 1.20 10*3/mm3    Eosinophils, Absolute 0.01 0.00 - 0.70 10*3/mm3    Basophils, Absolute 0.01 0.00 - 0.20 10*3/mm3    Immature Grans, Absolute 0.02 0.00 - 0.03 10*3/mm3   Urinalysis With / Culture If Indicated    Collection Time: 07/20/17 10:19 PM   Result Value Ref Range    Color, UA Yellow Yellow, Straw    Appearance, UA Clear Clear    pH, UA 5.5 5.0 - 8.0    Specific Gravity, UA 1.016 1.005 - 1.030    Glucose, UA Negative Negative    Ketones, UA Trace (A) Negative    Bilirubin, UA Negative Negative    Blood, UA Negative Negative    Protein, UA Negative Negative    Leuk Esterase, UA Moderate (2+) (A)  Negative    Nitrite, UA Negative Negative    Urobilinogen, UA 0.2 E.U./dL 0.2 - 1.0 E.U./dL   Urinalysis, Microscopic Only    Collection Time: 07/20/17 10:19 PM   Result Value Ref Range    RBC, UA 0-2 None Seen, 0-2 /HPF    WBC, UA 21-30 (A) None Seen, 0-2 /HPF    Bacteria, UA None Seen None Seen /HPF    Squamous Epithelial Cells, UA 0-2 None Seen, 0-2 /HPF    Hyaline Casts, UA 0-2 None Seen /LPF    Methodology Automated Microscopy        Ordered the above labs and reviewed the results.        RADIOLOGY  CT Abdomen Pelvis With Contrast   Preliminary Result   1.  Findings of moderately severe colitis of the ascending colon. No   obstruction, perforation or abscess.   2.  Chronic changes of the sigmoid colon suggesting prior episodes of   inflammation. Diverticulosis of the colon.    3.  Fatty infiltration of the liver. Renal cysts measuring up to 1 cm.        Interpreted by radiologist. Independently viewed by me.         XR Chest 2 View   Preliminary Result   No acute findings. Chronic lung changes.       Interpreted by radiologist. Independently viewed by me.             Ordered the above noted radiological studies. Reviewed by me in PACS.       PROCEDURES  Procedures        PROGRESS AND CONSULTS  ED Course   Comment By Time   10:43 PM  Patient here for confusion vomiting and diarrhea.  Appears to have moderately  severe colitis.  Discussed with Dr. Cook who will admit.  Levaquin and Flagyl. Luke Keith MD 07/20 224     9:21 PM:   UA and CXR ordered for further evaluation.     9:48 PM:  Ordered CT Abd for further evaluation.     10:30 PM:  Pt's CT Abd shows severe colitis of the ascending colon. Placed call to [facility] for admission. Decision time to admit: Now.      10:43 PM:  Discussed case with Dr. Cook, hospitalist. He will admit pt to a med/surg bed. Flagyl and levaquin ordered to treat for colitis.               MEDICAL DECISION MAKING      MDM  Number of Diagnoses or Management Options     Amount  and/or Complexity of Data Reviewed  Clinical lab tests: ordered and reviewed (WBC is 12.04. )  Tests in the radiology section of CPT®: ordered and reviewed (CT Abd:  1.  Findings of moderately severe colitis of the ascending colon. No obstruction, perforation or abscess.  2.  Chronic changes of the sigmoid colon suggesting prior episodes of inflammation. Diverticulosis of the colon.   3.  Fatty infiltration of the liver. Renal cysts measuring up to 1 cm.)  Obtain history from someone other than the patient: yes (Caretaker provides significant hx. )  Discuss the patient with other providers: yes (Case d/w Dr. Cook, hospitalist, who will admit pt to a med/surg bed.   )    Patient Progress  Patient progress: stable             DIAGNOSIS  Final diagnoses:   Nausea and vomiting, intractability of vomiting not specified, unspecified vomiting type   Colitis         DISPOSITION  Pt admitted to med/surg.            Latest Documented Vital Signs:  As of 10:50 PM  BP- 169/88 HR- 91 Temp- 96.1 °F (35.6 °C) (Rectal) O2 sat- 90%        --  Documentation assistance provided by rosas Damon for Dr. Sagar MD.  Information recorded by the rosas was done at my direction and has been verified and validated by me.       Hu Damon  07/20/17 3349       Luke Keith MD  07/20/17 3406

## 2017-07-21 NOTE — SIGNIFICANT NOTE
07/21/17 1117   Rehab Treatment   Discipline (PT Student)   Rehab Evaluation   Evaluation Not Performed (Pt very nauseated and not feeling well today per CELY Balderas.  About to recieve meds.  Check back tomorrow for eval per CELY Balderas.)   Recommendation   PT - Next Appointment 07/22/17

## 2017-07-22 LAB
ANION GAP SERPL CALCULATED.3IONS-SCNC: 9.7 MMOL/L
BACTERIA SPEC AEROBE CULT: NO GROWTH
BUN BLD-MCNC: 15 MG/DL (ref 8–23)
BUN/CREAT SERPL: 18.8 (ref 7–25)
CALCIUM SPEC-SCNC: 8.7 MG/DL (ref 8.2–9.6)
CHLORIDE SERPL-SCNC: 101 MMOL/L (ref 98–107)
CO2 SERPL-SCNC: 27.3 MMOL/L (ref 22–29)
CREAT BLD-MCNC: 0.8 MG/DL (ref 0.57–1)
DEPRECATED RDW RBC AUTO: 49.7 FL (ref 37–54)
ERYTHROCYTE [DISTWIDTH] IN BLOOD BY AUTOMATED COUNT: 14 % (ref 11.7–13)
GFR SERPL CREATININE-BSD FRML MDRD: 66 ML/MIN/1.73
GLUCOSE BLD-MCNC: 93 MG/DL (ref 65–99)
HCT VFR BLD AUTO: 38.1 % (ref 35.6–45.5)
HGB BLD-MCNC: 12.5 G/DL (ref 11.9–15.5)
MAGNESIUM SERPL-MCNC: 2 MG/DL (ref 1.7–2.3)
MCH RBC QN AUTO: 31.7 PG (ref 26.9–32)
MCHC RBC AUTO-ENTMCNC: 32.8 G/DL (ref 32.4–36.3)
MCV RBC AUTO: 96.7 FL (ref 80.5–98.2)
PLATELET # BLD AUTO: 182 10*3/MM3 (ref 140–500)
PMV BLD AUTO: 9.8 FL (ref 6–12)
POTASSIUM BLD-SCNC: 4.1 MMOL/L (ref 3.5–5.2)
RBC # BLD AUTO: 3.94 10*6/MM3 (ref 3.9–5.2)
SODIUM BLD-SCNC: 138 MMOL/L (ref 136–145)
WBC NRBC COR # BLD: 8.45 10*3/MM3 (ref 4.5–10.7)

## 2017-07-22 PROCEDURE — 85027 COMPLETE CBC AUTOMATED: CPT | Performed by: HOSPITALIST

## 2017-07-22 PROCEDURE — 80048 BASIC METABOLIC PNL TOTAL CA: CPT | Performed by: HOSPITALIST

## 2017-07-22 PROCEDURE — 93005 ELECTROCARDIOGRAM TRACING: CPT | Performed by: HOSPITALIST

## 2017-07-22 PROCEDURE — 93010 ELECTROCARDIOGRAM REPORT: CPT | Performed by: INTERNAL MEDICINE

## 2017-07-22 PROCEDURE — 83735 ASSAY OF MAGNESIUM: CPT | Performed by: HOSPITALIST

## 2017-07-22 RX ORDER — ACETAMINOPHEN 325 MG/1
650 TABLET ORAL EVERY 6 HOURS PRN
Status: DISCONTINUED | OUTPATIENT
Start: 2017-07-22 | End: 2017-07-24 | Stop reason: HOSPADM

## 2017-07-22 RX ADMIN — SODIUM CHLORIDE 100 ML/HR: 9 INJECTION, SOLUTION INTRAVENOUS at 03:48

## 2017-07-22 RX ADMIN — ATENOLOL 25 MG: 25 TABLET ORAL at 08:32

## 2017-07-22 RX ADMIN — DONEPEZIL HYDROCHLORIDE 5 MG: 5 TABLET, FILM COATED ORAL at 20:32

## 2017-07-22 RX ADMIN — METRONIDAZOLE 500 MG: 500 INJECTION, SOLUTION INTRAVENOUS at 06:23

## 2017-07-22 RX ADMIN — ACETAMINOPHEN 650 MG: 325 TABLET ORAL at 15:06

## 2017-07-22 RX ADMIN — APIXABAN 2.5 MG: 2.5 TABLET, FILM COATED ORAL at 08:32

## 2017-07-22 RX ADMIN — APIXABAN 2.5 MG: 2.5 TABLET, FILM COATED ORAL at 20:32

## 2017-07-22 RX ADMIN — ISOSORBIDE MONONITRATE 30 MG: 30 TABLET ORAL at 08:32

## 2017-07-22 NOTE — NURSING NOTE
Pt son called brittani Llanos [phone] cell number stated that his sister Becky will be in [place] on Monday. Stated they both had POA for patient. They are requesting [facility] for rehab services for discharge planning.

## 2017-07-22 NOTE — PLAN OF CARE
Problem: Patient Care Overview (Adult)  Goal: Plan of Care Review  Outcome: Ongoing (interventions implemented as appropriate)    07/22/17 1631   Coping/Psychosocial Response Interventions   Plan Of Care Reviewed With patient   Patient Care Overview   Progress no change   Outcome Evaluation   Outcome Summary/Follow up Plan c/o ha admin tylenol, sat in chair from PT, amb pt to bathroom did very well assist x1, low hr high 40's to 50's will cont to monitor       Goal: Adult Individualization and Mutuality  Outcome: Ongoing (interventions implemented as appropriate)  Goal: Discharge Needs Assessment  Outcome: Ongoing (interventions implemented as appropriate)    Problem: Fall Risk (Adult)  Goal: Absence of Falls  Outcome: Ongoing (interventions implemented as appropriate)    Problem: Infection, Risk/Actual (Adult)  Goal: Infection Prevention/Resolution  Outcome: Ongoing (interventions implemented as appropriate)    Problem: Skin Integrity Impairment, Risk/Actual (Adult)  Goal: Skin Integrity/Wound Healing  Outcome: Ongoing (interventions implemented as appropriate)    Problem: Nutrition, Imbalanced: Inadequate Oral Intake (Adult)  Goal: Improved Oral Intake  Outcome: Ongoing (interventions implemented as appropriate)  Goal: Prevent Further Weight Loss  Outcome: Ongoing (interventions implemented as appropriate)

## 2017-07-22 NOTE — PLAN OF CARE
Problem: Patient Care Overview (Adult)  Goal: Plan of Care Review  Outcome: Ongoing (interventions implemented as appropriate)    07/22/17 0423   Coping/Psychosocial Response Interventions   Plan Of Care Reviewed With patient   Patient Care Overview   Progress improving   Outcome Evaluation   Outcome Summary/Follow up Plan No episodes of n&v. Patient showing interest in eating breakfast this morning. Patient reoriented to situation, place, and time. Will continue to monitor.        Goal: Adult Individualization and Mutuality  Outcome: Ongoing (interventions implemented as appropriate)  Goal: Discharge Needs Assessment  Outcome: Ongoing (interventions implemented as appropriate)    Problem: Fall Risk (Adult)  Goal: Absence of Falls  Outcome: Ongoing (interventions implemented as appropriate)    Problem: Infection, Risk/Actual (Adult)  Goal: Infection Prevention/Resolution  Outcome: Ongoing (interventions implemented as appropriate)    Problem: Skin Integrity Impairment, Risk/Actual (Adult)  Goal: Skin Integrity/Wound Healing  Outcome: Ongoing (interventions implemented as appropriate)    Problem: Nutrition, Imbalanced: Inadequate Oral Intake (Adult)  Goal: Improved Oral Intake  Outcome: Ongoing (interventions implemented as appropriate)  Goal: Prevent Further Weight Loss  Outcome: Ongoing (interventions implemented as appropriate)

## 2017-07-22 NOTE — PROGRESS NOTES
Name: Audrey Roblero ADMIT: 2017   : 6/3/1920  PCP: Rob Torre Jr., MD    MRN: [id number] LOS: 2 days   AGE/SEX: 89 y.o. female  ROOM: [facility]     Subjective   Subjective  Looks much better today.  Denies any specific complaints.  Mildly confused but likely at baseline.    Objective   Vital Signs  Temp:  [98.1 °F (36.7 °C)-99.3 °F (37.4 °C)] 98.1 °F (36.7 °C)  Heart Rate:  [62-73] 62  Resp:  [16-18] 18  BP: (129-161)/(48-61) 129/49  Body mass index is 15.64 kg/(m^2).    Physical Exam   Constitutional: She is cooperative. She appears ill. No distress.   Elderly, frail, poor muscle mass   Neck: No JVD present. No tracheal deviation present. No thyromegaly present.   Cardiovascular: Normal rate and regular rhythm.    No murmur heard.  Pulmonary/Chest: Effort normal and breath sounds normal. No respiratory distress.   Abdominal: Soft. Normal appearance and bowel sounds are normal. She exhibits no distension. There is no tenderness (mild diffuse). There is no rebound and no guarding.   Neurological: She is alert.   Pleasantly confused   Skin: Skin is warm and dry. No rash noted.   Nursing note and vitals reviewed.      Results Review:       I reviewed the patient's new clinical results.    Results from last 7 days  Lab Units 17  0422 17  0819 17  2115   WBC 10*3/mm3 8.45 9.02 12.04*   HEMOGLOBIN g/dL 12.5 13.2 15.1   PLATELETS 10*3/mm3 182 216 186       Results from last 7 days  Lab Units 17  0422 17  0819 17  2115   SODIUM mmol/L 138 135* 138   POTASSIUM mmol/L 4.1 4.5  4.5 3.3*   CHLORIDE mmol/L 101 97* 93*   CO2 mmol/L 27.3 23.8 30.5*   BUN mg/dL 15 17 17   CREATININE mg/dL 0.80 0.82 0.83   GLUCOSE mg/dL 93 122* 195*   Estimated Creatinine Clearance: 31.3 mL/min (by C-G formula based on Cr of 0.8).    Results from last 7 days  Lab Units 17  0422 17  0819 17  2115   CALCIUM mg/dL 8.7 9.2 9.6   ALBUMIN g/dL  --   --  4.50   MAGNESIUM mg/dL 2.0  --    --        Results from last 7 days  Lab Units 07/20/17  2115   LACTATE mmol/L 1.7       Results from last 7 days  Lab Units 07/21/17  0056 07/20/17  2219 07/20/17  2115   BLOODCX  No growth at 24 hours  --  No growth at 24 hours   URINECX   --  No growth  --            CT SCAN ABDOMEN AND PELVIS  7/20/2017  1.  Findings of moderately severe colitis of the ascending colon. No  obstruction, perforation or abscess.  2.  Chronic changes of the sigmoid colon suggesting prior episodes of  inflammation. Diverticulosis of the colon.   3.  Fatty infiltration of the liver. Renal cysts measuring up to 1 cm.      apixaban 2.5 mg Oral Q12H   atenolol 25 mg Oral BID   donepezil 5 mg Oral Nightly   isosorbide mononitrate 30 mg Oral Daily   [START ON 7/23/2017] levoFLOXacin 750 mg Intravenous Q48H   metroNIDAZOLE 500 mg Intravenous Q8H       sodium chloride 100 mL/hr Last Rate: 100 mL/hr (07/22/17 0348)         Assessment/Plan   Assessment:     Active Hospital Problems (** Indicates Principal Problem)    Diagnosis Date Noted   • **Colitis presumed infectious (viral) [A09] 07/20/2017   • Severe malnutrition due to chronic illness [E43] 07/21/2017   • Nausea and vomiting [R11.2] 07/20/2017   • Atrial fibrillation [I48.91] 10/31/2016   • Chronic diastolic heart failure [I50.32] 10/31/2016      Resolved Hospital Problems    Diagnosis Date Noted Date Resolved   • Hypokalemia [E87.6] 11/01/2016 07/22/2017       Plan:   - Overall course of illness does not seem concerning for bacterial colitis.  Will discontinue antibiotics and observe.  - Saline lock IV  - Monitor QT.  Was actually prolonged yesterday but improved on today's EKG.  - CCP to assist with discharge planning  - PT evaluation.  - DNR      Phi Terrazas MD  [facility]  07/22/17  10:25 AM

## 2017-07-22 NOTE — PLAN OF CARE
Problem: Patient Care Overview (Adult)  Goal: Plan of Care Review    07/22/17 0809   Outcome Evaluation   Outcome Summary/Follow up Plan Patient received the initial PT eval. She completed supine to sit and sit to stand with contact guard assist. She ambulated 3 feet with rolling walker before requesting to sit due to headache. Patient would benefit from skilled physical therapy for work on tranfers and gait to allow her to return to her home with assistance. Overall functional status will be limited due to dementia.

## 2017-07-22 NOTE — PLAN OF CARE
Problem: Inpatient Physical Therapy  Goal: Gait Training Goal LTG- PT    07/22/17 1438   Gait Training PT LTG   Gait Training Goal PT LTG, Date Established 07/22/17   Gait Training Goal PT LTG, Time to Achieve 1 wk   Gait Training Goal PT LTG, Leslie Level contact guard assist   Gait Training Goal PT LTG, Assist Device walker, rolling   Gait Training Goal PT LTG, Distance to Achieve 100

## 2017-07-22 NOTE — THERAPY EVALUATION
Acute Care - Physical Therapy Initial Evaluation  [facility]     Patient Name: Audrey Roblero  : 6/3/1920  MRN: [id number]  Today's Date: 2017                Admit Date: 2017     Visit Dx:    ICD-10-CM ICD-9-CM   1. Nausea and vomiting, intractability of vomiting not specified, unspecified vomiting type R11.2 787.01   2. Colitis K52.9 558.9   3. Difficulty walking R26.2 719.7     Patient Active Problem List   Diagnosis   • Chronic diastolic heart failure   • Atrial fibrillation   • MR (mitral regurgitation)   • Nausea and vomiting   • Colitis presumed infectious (viral)   • Severe malnutrition due to chronic illness     Past Medical History:   Diagnosis Date   • Atrial fibrillation    • CAD (coronary artery disease)    • CHF (congestive heart failure)    • Colitis 2017   • Diastolic heart failure    • Hyperlipidemia    • Hypertension    • Mitral regurgitation    • SOB (shortness of breath)      Past Surgical History:   Procedure Laterality Date   • APPENDECTOMY     • CORONARY ANGIOPLASTY WITH STENT PLACEMENT  2008    Dr. Gupta   • TONSILLECTOMY            PT ASSESSMENT (last 72 hours)      PT Evaluation       17 1400 17 1117    Rehab Evaluation    Document Type evaluation  -MM     Evaluation Not Performed  --   Pt very nauseated and not feeling well today per CELY Balderas.  About to recieve meds.  Check back tomorrow for eval per CELY Balderas.  -CHING (r) NICLOASA (t) CHING (c)    Patient Effort, Rehab Treatment adequate  -MM     Symptoms Noted During/After Treatment none  -MM     General Information    Prior Level of Function --   Lived in house with assistance from [facility] [employer].   -MM     Equipment Currently Used at Home walker, rolling  -MM     Barriers to Rehab cognitive status  -MM     Living Environment    Lives With other (see comments)   Paid assistance from [employer].  -MM     Living Arrangements house  -MM     Clinical Impression    Patient/Family Goals Statement Patient said  she wants to go home.  -MM     Criteria for Skilled Therapeutic Interventions Met yes  -MM     Pathology/Pathophysiology Noted (Describe Specifically for Each System) musculoskeletal;integumentary   cognitive  -MM     Impairments Found (describe specific impairments) arousal, attention, and cognition;gait, locomotion, and balance  -MM     Rehab Potential fair, will monitor progress closely  -MM     Pain Assessment    Pain Assessment Advanced Dementia   Initially no pain, but at end of session, c/o headache.  -MM     Cognitive Assessment/Intervention    Current Cognitive/Communication Assessment impaired  -MM     Orientation Status disoriented to;place;situation  -MM     Follows Commands/Answers Questions 75% of the time  -MM     Personal Safety mild impairment  -MM     Personal Safety Interventions fall prevention program maintained  -MM     ROM (Range of Motion)    General ROM Detail ROM WFLS  -MM     MMT (Manual Muscle Testing)    General MMT Assessment Detail Grossly 3/5   -MM     Bed Mobility, Assessment/Treatment    Bed Mobility, Roll Right, [place] independent  -MM     Bed Mob, Supine to Sit, [place] contact guard assist  -MM     Transfer Assessment/Treatment    Transfers, Sit-Stand [place] contact guard assist  -MM     Transfers, Stand-Sit [place] verbal cues required;contact guard assist  -MM     Transfers, Sit-Stand-Sit, Assist Device rolling walker  -MM     Gait Assessment/Treatment    Gait, [place] Level contact guard assist  -MM     Gait, Assistive Device rolling walker  -MM     Gait, Distance (Feet) 3  -MM     Gait, Gait Deviations luisa decreased;forward flexed posture  -MM     Gait, Comment Patient requested to sit due to headache.  -MM     Balance Skills Training    Sitting-Level of Assistance Close supervision  -MM     Sitting-Balance Support Feet supported  -MM     Standing-Level of Assistance Contact guard  -MM     Static Standing Balance Support assistive device  -MM      Gait Balance-Level of Assistance Contact guard  -MM     Gait Balance Support assistive device  -MM     Positioning and Restraints    Pre-Treatment Position in bed  -MM     Post Treatment Position chair  -MM     In Chair notified nsg;call light within reach;exit alarm on  -MM       07/21/17 0034 07/21/17 0032    General Information    Equipment Currently Used at Home walker, rolling  -MS     Living Environment    Lives With  other (see comments)   with caregiver ([facility]. [employer]) every night for 7 days and few days in morning  -MS    Living Arrangements  house  -MS    Home Accessibility  no concerns  -MS    Stair Railings at Home  other (see comments)   does not use  basement stairs  -MS    Type of Financial/Environmental Concern  none  -MS    Transportation Available  ambulance  -MS      User Key  (r) = Recorded By, (t) = Taken By, (c) = Cosigned By    Initials Name Provider Type     Jimena Hendricks, PT Physical Therapist    MS Abbie Gomez, RN Registered Nurse    MM Kell Abreu, PT Physical Therapist    NICOLASA Lopez, PT Student PT Student          Physical Therapy Education     Title: PT OT SLP Therapies (Active)     Topic: Physical Therapy (Active)     Point: Mobility training (Active)    Learning Progress Summary    Learner Readiness Method Response Comment Documented by Status   Patient Acceptance E NR   07/22/17 1437 Active                      User Key     Initials Effective Dates Name Provider Type Discipline     07/05/16 -  Kell Abreu, PT Physical Therapist PT                PT Recommendation and Plan  Anticipated Discharge Disposition: home with 24/7 care  Planned Therapy Interventions: gait training, strengthening, transfer training  PT Frequency: daily  Plan of Care Review  Outcome Summary/Follow up Plan: Patient received the initial PT brisaal. She completed supine to sit and sit to stand with contact guard assist.  She ambulated 3 feet with rolling walker before requesting  to sit due to headache.  Patient would benefit from skilled physical therapy for work on tranfers and gait to allow her to return to her home with assistance.  Overall functional status will be limited due to dementia.          IP PT Goals       07/22/17 1438          Transfer Training PT LTG    Transfer Training PT LTG, Date Established 07/22/17  -MM      Transfer Training PT LTG, Time to Achieve 1 wk  -MM      Transfer Training PT LTG, Activity Type all transfers  -MM      Transfer Training PT LTG, [place] Level contact guard assist  -MM      Gait Training PT LTG    Gait Training Goal PT LTG, Date Established 07/22/17  -MM      Gait Training Goal PT LTG, Time to Achieve 1 wk  -MM      Gait Training Goal PT LTG, [place] Level contact guard assist  -MM      Gait Training Goal PT LTG, Assist Device walker, rolling  -MM      Gait Training Goal PT LTG, Distance to Achieve 100  -MM        User Key  (r) = Recorded By, (t) = Taken By, (c) = Cosigned By    Initials Name Provider Type    JOCELYNE Abreu, PT Physical Therapist                Outcome Measures       07/22/17 1400          How much help from another person do you currently need...    Turning from your back to your side while in flat bed without using bedrails? 4  -MM      Moving from lying on back to sitting on the side of a flat bed without bedrails? 3  -MM      Moving to and from a bed to a chair (including a wheelchair)? 3  -MM      Standing up from a chair using your arms (e.g., wheelchair, bedside chair)? 3  -MM      Climbing 3-5 steps with a railing? 2  -MM      To walk in hospital room? 3  -MM      AM-PAC 6 Clicks Score 18  -MM      Functional Assessment    Outcome Measure Options AM-PAC 6 Clicks Basic Mobility (PT)  -MM        User Key  (r) = Recorded By, (t) = Taken By, (c) = Cosigned By    Initials Name Provider Type    JOCELYNE Abreu, PT Physical Therapist           Time Calculation:         PT Charges       07/22/17 7790           Time Calculation    Start Time 1400  -MM      Stop Time 1425  -MM      Time Calculation (min) 25 min  -MM      PT Received On 07/22/17  -MM      PT - Next Appointment 07/23/17  -MM        User Key  (r) = Recorded By, (t) = Taken By, (c) = Cosigned By    Initials Name Provider Type    MM Kell Abreu, PT Physical Therapist              PT G-Codes  Outcome Measure Options: AM-PAC 6 Clicks Basic Mobility (PT)      Kell Abreu, PT  7/22/2017

## 2017-07-23 PROCEDURE — 97110 THERAPEUTIC EXERCISES: CPT

## 2017-07-23 RX ADMIN — APIXABAN 2.5 MG: 2.5 TABLET, FILM COATED ORAL at 21:33

## 2017-07-23 RX ADMIN — DONEPEZIL HYDROCHLORIDE 5 MG: 5 TABLET, FILM COATED ORAL at 21:33

## 2017-07-23 RX ADMIN — APIXABAN 2.5 MG: 2.5 TABLET, FILM COATED ORAL at 08:24

## 2017-07-23 NOTE — PLAN OF CARE
Problem: Patient Care Overview (Adult)  Goal: Plan of Care Review  Outcome: Ongoing (interventions implemented as appropriate)    07/23/17 1001   Coping/Psychosocial Response Interventions   Plan Of Care Reviewed With patient   Patient Care Overview   Progress progress toward functional goals as expected   Outcome Evaluation   Outcome Summary/Follow up Plan Pt able to increase ambulation distance today, however continues to c/o HA upon standing/ambulation activities. Pt encouraged to participate and proress with gait activities and able to perform with CGA and verbal cueing. Pt continues to be candidate for skilled PT services.

## 2017-07-23 NOTE — PLAN OF CARE
Problem: Patient Care Overview (Adult)  Goal: Plan of Care Review  Outcome: Ongoing (interventions implemented as appropriate)    07/23/17 0324   Coping/Psychosocial Response Interventions   Plan Of Care Reviewed With patient   Patient Care Overview   Progress improving       Goal: Adult Individualization and Mutuality  Outcome: Ongoing (interventions implemented as appropriate)  Goal: Discharge Needs Assessment  Outcome: Ongoing (interventions implemented as appropriate)    Problem: Fall Risk (Adult)  Goal: Absence of Falls  Outcome: Ongoing (interventions implemented as appropriate)    Problem: Infection, Risk/Actual (Adult)  Goal: Infection Prevention/Resolution  Outcome: Ongoing (interventions implemented as appropriate)    Problem: Skin Integrity Impairment, Risk/Actual (Adult)  Goal: Skin Integrity/Wound Healing  Outcome: Ongoing (interventions implemented as appropriate)    Problem: Nutrition, Imbalanced: Inadequate Oral Intake (Adult)  Goal: Improved Oral Intake  Outcome: Ongoing (interventions implemented as appropriate)  Goal: Prevent Further Weight Loss  Outcome: Ongoing (interventions implemented as appropriate)

## 2017-07-23 NOTE — PLAN OF CARE
Problem: Patient Care Overview (Adult)  Goal: Plan of Care Review  Outcome: Ongoing (interventions implemented as appropriate)    07/23/17 4410   Coping/Psychosocial Response Interventions   Plan Of Care Reviewed With patient   Outcome Evaluation   Outcome Summary/Follow up Plan denied pain, HR mid 50's, up to chair with PT, still confused with repetition will cont to monitor       Goal: Adult Individualization and Mutuality  Outcome: Ongoing (interventions implemented as appropriate)  Goal: Discharge Needs Assessment  Outcome: Ongoing (interventions implemented as appropriate)    Problem: Fall Risk (Adult)  Goal: Absence of Falls  Outcome: Ongoing (interventions implemented as appropriate)    Problem: Infection, Risk/Actual (Adult)  Goal: Infection Prevention/Resolution  Outcome: Ongoing (interventions implemented as appropriate)    Problem: Skin Integrity Impairment, Risk/Actual (Adult)  Goal: Skin Integrity/Wound Healing  Outcome: Ongoing (interventions implemented as appropriate)    Problem: Nutrition, Imbalanced: Inadequate Oral Intake (Adult)  Goal: Improved Oral Intake  Outcome: Ongoing (interventions implemented as appropriate)  Goal: Prevent Further Weight Loss  Outcome: Ongoing (interventions implemented as appropriate)

## 2017-07-23 NOTE — PROGRESS NOTES
Discharge Planning Assessment  [facility]     Patient Name: Audrey Roblero  MRN: [id number]  Today's Date: 7/23/2017    Admit Date: 7/20/2017          Discharge Needs Assessment       07/23/17 1516    Living Environment    Lives With alone    Living Arrangements house    Home Accessibility no concerns    Type of Financial/Environmental Concern none    Transportation Available family or friend will provide;car;agency transportation    Living Environment    Provides Primary Care For no one, unable/limited ability to care for self    Primary Care Provided By homecare agency (specify)   [facility] [employer] caregivers    Quality Of Family Relationships supportive;helpful;involved    Able to Return to Prior Living Arrangements no    Discharge Needs Assessment    Concerns To Be Addressed basic needs concerns;discharge planning concerns    Readmission Within The Last 30 Days no previous admission in last 30 days    Outpatient/Agency/Support Group Needs homecare agency (specify level of care)    Community Agency Name(S) [facility] caregiver services    Anticipated Changes Related to Illness inability to care for self    Current Discharge Risk cognitively impaired;lives alone;physical impairment    Discharge Disposition still a patient            Discharge Plan       07/23/17 1520    Case Management/Social Work Plan    Plan Referral to [facility] for [facility] with likely transition to LTC    Patient/Family In Agreement With Plan yes    Additional Comments Call placed to son Viet ([phone]) after unsuccessfully attempting to reach listed emergency contacts. Viet states he and his sister Becky share POA and are both involved in decision making. Pt resides at home and has [facility] [employer] caregivers services from 7pm -7am 7 days a week and for 5 hours a day 3 days a week. Pt does have a niece who lives locally but spends the summers in [place]. Both Viet and Becky live out of state. Pt does have a neighbor who is able to check on  her frequently when caregivers are not in the home. Viet states pt has had an overall decline and they are interested in LTC at [facility] (pt has been for skilled care in the past). Referral emailed to Romi with [facility] to evaluate on Monday. Viet states Becky will be in from out of town Monday and available to further assist with discharge planning. CCP to f/up with Romi in AM for bed availability and continued discharge planning as indicated..................CELY Freeman, CCP      07/23/17 1518    Case Management/Social Work Plan    Patient/Family In Agreement With Plan yes        Discharge Placement     Facility/Agency Request Status Selected? Address Phone Number Fax Number    [facility] Pending - Request Sent     [address] [address] [phone]        Darell Martinez RN 7/23/2017 15:18    Email to Romi Martinez RN                             Demographic Summary       07/23/17 1519    Contact Information    Comments daughter becky or son Viet      07/23/17 1513    Referral Information    Admission Type inpatient    Arrived From still a patient    Reason For Consult discharge planning    Record Reviewed medical record    Contact Information    Permission Granted to Share Information With family/designee            Functional Status       07/23/17 1516    Functional Status Current    Ambulation 3-->assistive equipment and person    Transferring 3-->assistive equipment and person    Toileting 3-->assistive equipment and person    Bathing 2-->assistive person    Dressing 2-->assistive person    Eating 0-->independent    Functional Status Prior    Ambulation 1-->assistive equipment    Transferring 0-->independent    Toileting 0-->independent    Bathing 2-->assistive person    Dressing 0-->independent    Eating 0-->independent    IADL    Meal Preparation assistive person    Housekeeping assistive person    Laundry assistive person    Shopping assistive  person            Psychosocial     None            Abuse/Neglect     None            Legal       07/23/17 1520    Legal    Legal Comments shared between son Viet and daughter Becky      07/23/17 1515    Legal    Legal Considerations patient/family management of healthcare needs    Assistance with Managing/Advocating Healthcare Needs power of  for healthcare;power of  for finances    Legal Comments shared between son Jose and daughter Becky            Substance Abuse     None            Patient Forms     None          Darell Martinez RN

## 2017-07-23 NOTE — THERAPY TREATMENT NOTE
Acute Care - Physical Therapy Treatment Note  [facility]     Patient Name: Audrey Roblero  : 6/3/1920  MRN: [id number]  Today's Date: 2017             Admit Date: 2017    Visit Dx:    ICD-10-CM ICD-9-CM   1. Nausea and vomiting, intractability of vomiting not specified, unspecified vomiting type R11.2 787.01   2. Colitis K52.9 558.9   3. Difficulty walking R26.2 719.7     Patient Active Problem List   Diagnosis   • Chronic diastolic heart failure   • Atrial fibrillation   • MR (mitral regurgitation)   • Nausea and vomiting   • Colitis presumed infectious (viral)   • Severe malnutrition due to chronic illness               Adult Rehabilitation Note       17 0942          Rehab Assessment/Intervention    Discipline physical therapist  -CN      Document Type therapy note (daily note)  -CN      Subjective Information agree to therapy;no complaints  -CN      Patient Effort, Rehab Treatment adequate  -CN      Symptoms Noted During/After Treatment increased pain;fatigue  -CN      Recorded by [CN] Bibiana Arrieta, PT      Vital Signs    Pre SpO2 (%) 96  -CN      O2 Delivery Pre Treatment room air  -CN      Post SpO2 (%) 95  -CN      O2 Delivery Post Treatment room air  -CN      Recorded by [CN] Bibiana Arrieta, PT      Pain Assessment    Pain Assessment Advanced Dementia   Pt reports no pain prior to session, however c/o HA with amb  -CN      Recorded by [CN] Bibiana Arrieta, NIKITA      Cognitive Assessment/Intervention    Current Cognitive/Communication Assessment impaired  -CN      Orientation Status disoriented to;place;situation  -CN      Follows Commands/Answers Questions 75% of the time  -CN      Personal Safety mild impairment  -CN      Personal Safety Interventions fall prevention program maintained;gait belt;nonskid shoes/slippers when out of bed  -CN      Recorded by [CN] Bibiana Arrieta, PT      Bed Mobility, Assessment/Treatment    Bed Mob, Supine to Sit,  [place] contact guard assist;verbal cues required  -CN      Recorded by [CN] Bibiana Arrieta PT      Transfer Assessment/Treatment    Transfers, Sit-Stand [place] contact guard assist;verbal cues required  -CN      Transfers, Stand-Sit [place] contact guard assist;verbal cues required  -CN      Transfers, Sit-Stand-Sit, Assist Device rolling walker  -CN      Transfer, Safety Issues balance decreased during turns;sequencing ability decreased;step length decreased;weight-shifting ability decreased;loses balance backward  -CN      Transfer, Impairments strength decreased;impaired balance  -CN      Recorded by [CN] Bibiana Arrieta PT      Gait Assessment/Treatment    Gait, [place] Level contact guard assist  -CN      Gait, Assistive Device rolling walker  -CN      Gait, Distance (Feet) 12  -CN      Gait, Gait Deviations luisa decreased;forward flexed posture;narrow base;step length decreased;stride length decreased  -CN      Gait, Safety Issues step length decreased;balance decreased during turns;weight-shifting ability decreased  -CN      Gait, Impairments impaired balance;strength decreased  -CN      Recorded by [CN] Bibiana Arrieta PT      Balance Skills Training    Sitting-Level of Assistance Close supervision  -CN      Sitting-Balance Support Feet supported  -CN      Standing-Level of Assistance Contact guard  -CN      Static Standing Balance Support assistive device  -CN      Gait Balance-Level of Assistance Contact guard  -CN      Gait Balance Support assistive device  -CN      Recorded by [CN] Bibiana Arrieta PT      Positioning and Restraints    Pre-Treatment Position in bed  -CN      Post Treatment Position chair  -CN      In Chair notified nsg;reclined;call light within reach;encouraged to call for assist;exit alarm on;legs elevated  -CN      Recorded by [CN] Bibiana Arrieta PT        User Key  (r) = Recorded By, (t) = Taken By, (c) = Cosigned By     Initials Name Effective Dates    CN Bibiana Arrieta, PT 01/27/16 -                 IP PT Goals       07/22/17 1438          Transfer Training PT LTG    Transfer Training PT LTG, Date Established 07/22/17  -MM      Transfer Training PT LTG, Time to Achieve 1 wk  -MM      Transfer Training PT LTG, Activity Type all transfers  -MM      Transfer Training PT LTG, [place] Level contact guard assist  -MM      Gait Training PT LTG    Gait Training Goal PT LTG, Date Established 07/22/17  -MM      Gait Training Goal PT LTG, Time to Achieve 1 wk  -MM      Gait Training Goal PT LTG, [place] Level contact guard assist  -MM      Gait Training Goal PT LTG, Assist Device walker, rolling  -MM      Gait Training Goal PT LTG, Distance to Achieve 100  -MM        User Key  (r) = Recorded By, (t) = Taken By, (c) = Cosigned By    Initials Name Provider Type    MM Kell Abreu, PT Physical Therapist          Physical Therapy Education     Title: PT OT SLP Therapies (Active)     Topic: Physical Therapy (Active)     Point: Mobility training (Active)    Learning Progress Summary    Learner Readiness Method Response Comment Documented by Status   Patient Acceptance E NR  CN 07/23/17 1000 Active    Acceptance E NR   07/22/17 1437 Active                      User Key     Initials Effective Dates Name Provider Type Discipline     01/27/16 -  Bibiana Arrieta, PT Physical Therapist PT     07/05/16 -  Kell Abreu PT Physical Therapist PT                    PT Recommendation and Plan  Anticipated Discharge Disposition: home with 24/7 care  Planned Therapy Interventions: gait training, strengthening, transfer training  PT Frequency: daily  Plan of Care Review  Plan Of Care Reviewed With: patient  Progress: progress toward functional goals as expected  Outcome Summary/Follow up Plan: Pt able to increase ambulation distance today, however continues to c/o HA upon standing/ambulation activities. Pt encouraged to  participate and proress with gait activities and able to perform with CGA and verbal cueing. Pt continues to be candidate for skilled PT services.           Outcome Measures       07/23/17 1000 07/22/17 1400       How much help from another person do you currently need...    Turning from your back to your side while in flat bed without using bedrails? 4  -CN 4  -MM     Moving from lying on back to sitting on the side of a flat bed without bedrails? 3  -CN 3  -MM     Moving to and from a bed to a chair (including a wheelchair)? 3  -CN 3  -MM     Standing up from a chair using your arms (e.g., wheelchair, bedside chair)? 3  -CN 3  -MM     Climbing 3-5 steps with a railing? 2  -CN 2  -MM     To walk in hospital room? 3  -CN 3  -MM     AM-PAC 6 Clicks Score 18  -CN 18  -MM     Functional Assessment    Outcome Measure Options AM-PAC 6 Clicks Basic Mobility (PT)  -CN AM-PAC 6 Clicks Basic Mobility (PT)  -MM       User Key  (r) = Recorded By, (t) = Taken By, (c) = Cosigned By    Initials Name Provider Type    BEAU Arrieta, PT Physical Therapist    MM Kell Abreu, PT Physical Therapist           Time Calculation:         PT Charges       07/23/17 1002          Time Calculation    Start Time 0942  -CN      Stop Time 0958  -CN      Time Calculation (min) 16 min  -CN      PT - Next Appointment 07/24/17  -CN        User Key  (r) = Recorded By, (t) = Taken By, (c) = Cosigned By    Initials Name Provider Type    BEAU Arrieta, NIKITA Physical Therapist          Therapy Charges for Today     Code Description Service Date Service Provider Modifiers Qty    [id number]  PT THER PROC EA 15 MIN 7/23/2017 Bibiana Arrieta, PT GP 1          PT G-Codes  Outcome Measure Options: AM-PAC 6 Clicks Basic Mobility (PT)    Bibiana Arrieta PT  7/23/2017

## 2017-07-23 NOTE — PROGRESS NOTES
Name: Audrey Roblero ADMIT: 2017   : 6/3/1920  PCP: Rob Torre Jr., MD    MRN: [id number] LOS: 3 days   AGE/SEX: 89 y.o. female  ROOM: [facility]     Subjective   Subjective  Overall still doing well.  C/o dry mouth.  uncomfortable in chair.    Objective   Vital Signs  Temp:  [97.8 °F (36.6 °C)-98.2 °F (36.8 °C)] 97.9 °F (36.6 °C)  Heart Rate:  [49-58] 54  Resp:  [18] 18  BP: (108-160)/(51-75) 160/57  Body mass index is 15.64 kg/(m^2).    Physical Exam   Constitutional: She is cooperative. She appears ill. No distress.   Elderly, frail, poor muscle mass   Neck: No JVD present. No tracheal deviation present. No thyromegaly present.   Cardiovascular: Normal rate and regular rhythm.    No murmur heard.  Pulmonary/Chest: Effort normal and breath sounds normal. No respiratory distress.   Abdominal: Soft. Normal appearance and bowel sounds are normal. She exhibits no distension. There is no tenderness (mild diffuse). There is no rebound and no guarding.   Neurological: She is alert.   Pleasantly confused   Skin: Skin is warm and dry. No rash noted.   Nursing note and vitals reviewed.      Results Review:       I reviewed the patient's new clinical results.    Results from last 7 days  Lab Units 17  0422 17  0819 17  2115   WBC 10*3/mm3 8.45 9.02 12.04*   HEMOGLOBIN g/dL 12.5 13.2 15.1   PLATELETS 10*3/mm3 182 216 186       Results from last 7 days  Lab Units 17  0422 17  0819 17  2115   SODIUM mmol/L 138 135* 138   POTASSIUM mmol/L 4.1 4.5  4.5 3.3*   CHLORIDE mmol/L 101 97* 93*   CO2 mmol/L 27.3 23.8 30.5*   BUN mg/dL 15 17 17   CREATININE mg/dL 0.80 0.82 0.83   GLUCOSE mg/dL 93 122* 195*   Estimated Creatinine Clearance: 31.3 mL/min (by C-G formula based on Cr of 0.8).    Results from last 7 days  Lab Units 17  0422 17  0819 17  2115   CALCIUM mg/dL 8.7 9.2 9.6   ALBUMIN g/dL  --   --  4.50   MAGNESIUM mg/dL 2.0  --   --        Results from last 7  days  Lab Units 07/20/17  2115   LACTATE mmol/L 1.7       Results from last 7 days  Lab Units 07/21/17  0056 07/20/17  2219 07/20/17  2115   BLOODCX  No growth at 2 days  --  No growth at 2 days   URINECX   --  No growth  --            CT SCAN ABDOMEN AND PELVIS  7/20/2017  1.  Findings of moderately severe colitis of the ascending colon. No  obstruction, perforation or abscess.  2.  Chronic changes of the sigmoid colon suggesting prior episodes of  inflammation. Diverticulosis of the colon.   3.  Fatty infiltration of the liver. Renal cysts measuring up to 1 cm.      apixaban 2.5 mg Oral Q12H   atenolol 25 mg Oral BID   donepezil 5 mg Oral Nightly   isosorbide mononitrate 30 mg Oral Daily            Assessment/Plan   Assessment:     Active Hospital Problems (** Indicates Principal Problem)    Diagnosis Date Noted   • **Colitis presumed infectious (viral) [A09] 07/20/2017   • Severe malnutrition due to chronic illness [E43] 07/21/2017   • Nausea and vomiting [R11.2] 07/20/2017   • Atrial fibrillation [I48.91] 10/31/2016   • Chronic diastolic heart failure [I50.32] 10/31/2016      Resolved Hospital Problems    Diagnosis Date Noted Date Resolved   • Hypokalemia [E87.6] 11/01/2016 07/22/2017       Plan:   - Overall course of illness does not seem concerning for bacterial colitis.  Remains stable off of antibiotics.  - Monitor QT.  Is rather prolonged.  Levaquin stopped.   DC zofran order.  Has been prolonged since at least 10/2016.  - CCP to assist with discharge planning for SNU.  - PT evaluation.  - DNR      Phi Terrazas MD  [facility]  07/23/17  9:32 AM

## 2017-07-23 NOTE — DISCHARGE PLACEMENT REQUEST
"Audrey Vance (89 y.o. Female)     Date of Birth Social Security Number Address Home Phone MRN    06/03/1920  [id number] [facility] [id number] [phone] [id number]    Christian Marital Status          None        Admission Date Admission Type Admitting Provider Attending Provider Department, Room/Bed    7/20/17 Emergency Eliud Cook MD Ray, Jonathan, MD [address], 651/1    Discharge Date Discharge Disposition Discharge Destination                      Attending Provider: Phi Terrazas MD     Allergies:  No Known Allergies    Isolation:  Spore   Infection:  C.difficile (rule out) (07/21/17)   Code Status:  Conditional    Ht:  70\" (177.8 cm)   Wt:  109 lb (49.4 kg)    Admission Cmt:  None   Principal Problem:  Colitis presumed infectious (viral) [A09]                 Active Insurance as of 7/20/2017     Primary Coverage     Payor Plan Insurance Group Employer/Plan Group    MEDICARE MEDICARE A & B      Payor Plan Address Payor Plan Phone Number Effective From Effective To    [address] [phone] 6/1/1985     [place] [address]       Subscriber Name Subscriber Birth Date Member ID       AUDREY VANCE 6/3/1920 [id number]           Secondary Coverage     Payor Plan Insurance Group Employer/Plan Group     FOR LIFE  FOR LIFE  SUPP      Payor Plan Address Payor Plan Phone Number Effective From Effective To    [address] [phone] 10/29/2016     [place] [address]       Subscriber Name Subscriber Birth Date Member ID       AUDREY VANCE 6/3/1920 [id number]                 Emergency Contacts      (Rel.) Home Phone Work Phone Mobile Phone    ShaliniYanira das (Relative) [phone] -- --    Becky Bernstein (Daughter) [phone] -- [phone]              "

## 2017-07-24 VITALS
TEMPERATURE: 97.5 F | HEIGHT: 70 IN | RESPIRATION RATE: 18 BRPM | HEART RATE: 88 BPM | WEIGHT: 109 LBS | OXYGEN SATURATION: 94 % | DIASTOLIC BLOOD PRESSURE: 49 MMHG | SYSTOLIC BLOOD PRESSURE: 129 MMHG | BODY MASS INDEX: 15.6 KG/M2

## 2017-07-24 LAB
ANION GAP SERPL CALCULATED.3IONS-SCNC: 13.1 MMOL/L
BUN BLD-MCNC: 11 MG/DL (ref 8–23)
BUN/CREAT SERPL: 16.4 (ref 7–25)
CALCIUM SPEC-SCNC: 8.3 MG/DL (ref 8.2–9.6)
CHLORIDE SERPL-SCNC: 100 MMOL/L (ref 98–107)
CO2 SERPL-SCNC: 24.9 MMOL/L (ref 22–29)
CREAT BLD-MCNC: 0.67 MG/DL (ref 0.57–1)
GFR SERPL CREATININE-BSD FRML MDRD: 81 ML/MIN/1.73
GLUCOSE BLD-MCNC: 110 MG/DL (ref 65–99)
MAGNESIUM SERPL-MCNC: 2.1 MG/DL (ref 1.7–2.3)
POTASSIUM BLD-SCNC: 3.9 MMOL/L (ref 3.5–5.2)
SODIUM BLD-SCNC: 138 MMOL/L (ref 136–145)

## 2017-07-24 PROCEDURE — 93005 ELECTROCARDIOGRAM TRACING: CPT | Performed by: HOSPITALIST

## 2017-07-24 PROCEDURE — 97110 THERAPEUTIC EXERCISES: CPT

## 2017-07-24 PROCEDURE — 83735 ASSAY OF MAGNESIUM: CPT | Performed by: HOSPITALIST

## 2017-07-24 PROCEDURE — 80048 BASIC METABOLIC PNL TOTAL CA: CPT | Performed by: HOSPITALIST

## 2017-07-24 PROCEDURE — 93010 ELECTROCARDIOGRAM REPORT: CPT | Performed by: INTERNAL MEDICINE

## 2017-07-24 RX ORDER — ATENOLOL 25 MG/1
12.5 TABLET ORAL 2 TIMES DAILY
Status: DISCONTINUED | OUTPATIENT
Start: 2017-07-24 | End: 2017-07-24 | Stop reason: HOSPADM

## 2017-07-24 RX ORDER — ATENOLOL 25 MG/1
12.5 TABLET ORAL 2 TIMES DAILY
Qty: 30 TABLET | Refills: 0 | Status: SHIPPED | OUTPATIENT
Start: 2017-07-24

## 2017-07-24 RX ADMIN — ATENOLOL 25 MG: 25 TABLET ORAL at 09:43

## 2017-07-24 RX ADMIN — APIXABAN 2.5 MG: 2.5 TABLET, FILM COATED ORAL at 09:43

## 2017-07-24 RX ADMIN — ISOSORBIDE MONONITRATE 30 MG: 30 TABLET ORAL at 09:43

## 2017-07-24 RX ADMIN — ACETAMINOPHEN 650 MG: 325 TABLET ORAL at 16:16

## 2017-07-24 NOTE — PROGRESS NOTES
Continued Stay Note  [facility]     Patient Name: Audrey Roblero  MRN: [id number]  Today's Date: 7/24/2017    Admit Date: 7/20/2017          Discharge Plan       07/24/17 1159    Case Management/Social Work Plan    Plan [facility]    Patient/Family In Agreement With Plan yes    Additional Comments S/w Gemma/Signature and they will have a bed for patient and can accept.  Gemma waiting on daughter to make it in from [place] to finalize plans.  Rell, RN, CCP              Discharge Codes     None            Denise Maldonado RN

## 2017-07-24 NOTE — PROGRESS NOTES
Name: Audrey Roblero ADMIT: 2017   : 6/3/1920  PCP: Rob Torre Jr., MD    MRN: [id number] LOS: 4 days   AGE/SEX: 89 y.o. female  ROOM: [facility]   Subjective   Subjective  Denies CP SOA NVD abd pain.    Objective   Vital Signs  Temp:  [97.9 °F (36.6 °C)-98.1 °F (36.7 °C)] 97.9 °F (36.6 °C)  Heart Rate:  [49-88] 88  Resp:  [18] 18  BP: (146-152)/(50-93) 152/93  SpO2:  [94 %-97 %] 94 %  on   ;   O2 Device: room air  Body mass index is 15.64 kg/(m^2).    Physical Exam   Constitutional: She appears well-developed. She is cooperative. She appears ill. No distress.   Frail   HENT:   Head: Normocephalic and atraumatic.   Eyes: EOM are normal. Pupils are equal, round, and reactive to light.   Neck: Normal range of motion. Neck supple.   Cardiovascular: Normal rate, regular rhythm, normal heart sounds and intact distal pulses.    No murmur heard.  Pulmonary/Chest: Effort normal and breath sounds normal. She has no wheezes.   Abdominal: Soft. Normal appearance and bowel sounds are normal. She exhibits no distension. There is no tenderness.   Musculoskeletal: Normal range of motion. She exhibits no edema.   Neurological: She is alert. No cranial nerve deficit.   Skin: Skin is warm and dry. She is not diaphoretic.   Psychiatric: She has a normal mood and affect. Her behavior is normal.   Nursing note and vitals reviewed.      Results Review:       I reviewed the patient's new clinical results. Reviewed EKG, QTc improved to 469, NSR.    Results from last 7 days  Lab Units 17  0422 17  0819 17  2115   WBC 10*3/mm3 8.45 9.02 12.04*   HEMOGLOBIN g/dL 12.5 13.2 15.1   PLATELETS 10*3/mm3 182 216 186     Results from last 7 days  Lab Units 17  0559 17  0422 17  0819 17  2115   SODIUM mmol/L 138 138 135* 138   POTASSIUM mmol/L 3.9 4.1 4.5  4.5 3.3*   CHLORIDE mmol/L 100 101 97* 93*   CO2 mmol/L 24.9 27.3 23.8 30.5*   BUN mg/dL 11 15 17 17   CREATININE mg/dL 0.67 0.80 0.82 0.83    GLUCOSE mg/dL 110* 93 122* 195*   Estimated Creatinine Clearance: 31.3 mL/min (by C-G formula based on Cr of 0.67).  Results from last 7 days  Lab Units 07/24/17  0559 07/22/17  0422 07/21/17  0819 07/20/17  2115   CALCIUM mg/dL 8.3 8.7 9.2 9.6   ALBUMIN g/dL  --   --   --  4.50   MAGNESIUM mg/dL 2.1 2.0  --   --          apixaban 2.5 mg Oral Q12H   atenolol 25 mg Oral BID   donepezil 5 mg Oral Nightly   isosorbide mononitrate 30 mg Oral Daily      Diet Regular      Assessment/Plan   Assessment:     Active Hospital Problems (** Indicates Principal Problem)    Diagnosis Date Noted   • **Colitis presumed infectious (viral) [A09] 07/20/2017   • Severe malnutrition due to chronic illness [E43] 07/21/2017   • Nausea and vomiting [R11.2] 07/20/2017   • Atrial fibrillation [I48.91] 10/31/2016   • Chronic diastolic heart failure [I50.32] 10/31/2016      Resolved Hospital Problems    Diagnosis Date Noted Date Resolved   • Hypokalemia [E87.6] 11/01/2016 07/22/2017       Plan:   - Colitis: resolved  - AFib: Sinus on repeat EKG. QTc improved off of rate prolonging medications. Continue Atenolol, ISMN.  - Dementia: Continue Donepezil. Not fully oriented but pleasant and redirectable.    Disposition  Daughter to come in this afternoon to discuss placement options. CCP assisting, appreciate aid.    Skip Goldsmith MD  [facility]  07/24/17  10:11 AM

## 2017-07-24 NOTE — PLAN OF CARE
Problem: Patient Care Overview (Adult)  Goal: Plan of Care Review  Outcome: Ongoing (interventions implemented as appropriate)    07/24/17 1413   Coping/Psychosocial Response Interventions   Plan Of Care Reviewed With patient   Outcome Evaluation   Outcome Summary/Follow up Plan Pt with improved upright stability and ambulation endurance this date. Pt remains confused but easily directed, no new concerns.

## 2017-07-24 NOTE — PLAN OF CARE
Problem: Patient Care Overview (Adult)  Goal: Plan of Care Review  Outcome: Ongoing (interventions implemented as appropriate)    07/24/17 5525   Outcome Evaluation   Outcome Summary/Follow up Plan Pt being discharged later this evening. Rested most of shift. Up in chair for lunch. SB to SR on the monitor.       Goal: Adult Individualization and Mutuality  Outcome: Ongoing (interventions implemented as appropriate)  Goal: Discharge Needs Assessment  Outcome: Ongoing (interventions implemented as appropriate)    Problem: Fall Risk (Adult)  Goal: Absence of Falls  Outcome: Ongoing (interventions implemented as appropriate)    Problem: Infection, Risk/Actual (Adult)  Goal: Infection Prevention/Resolution  Outcome: Ongoing (interventions implemented as appropriate)    Problem: Skin Integrity Impairment, Risk/Actual (Adult)  Goal: Skin Integrity/Wound Healing  Outcome: Ongoing (interventions implemented as appropriate)    Problem: Nutrition, Imbalanced: Inadequate Oral Intake (Adult)  Goal: Improved Oral Intake  Outcome: Ongoing (interventions implemented as appropriate)  Goal: Prevent Further Weight Loss  Outcome: Ongoing (interventions implemented as appropriate)    Problem: Pressure Ulcer Risk (Terrell Scale) (Adult,Obstetrics,Pediatric)  Goal: Identify Related Risk Factors and Signs and Symptoms  Outcome: Ongoing (interventions implemented as appropriate)  Goal: Skin Integrity  Outcome: Ongoing (interventions implemented as appropriate)

## 2017-07-24 NOTE — NURSING NOTE
Called report to CELY Manuel at [facility].  Patient's daughter will be transporting her later this evening.  PETE Abarham RN

## 2017-07-24 NOTE — DISCHARGE SUMMARY
Date of Admission: 7/20/2017  Date of Discharge:  7/24/2017  Primary Care Physician: Rob Torre Jr., MD     Discharge Diagnosis:  Active Hospital Problems (** Indicates Principal Problem)    Diagnosis Date Noted   • **Colitis presumed infectious (viral) [A09] 07/20/2017   • Severe malnutrition due to chronic illness [E43] 07/21/2017   • Nausea and vomiting [R11.2] 07/20/2017   • Atrial fibrillation [I48.91] 10/31/2016   • Chronic diastolic heart failure [I50.32] 10/31/2016      Resolved Hospital Problems    Diagnosis Date Noted Date Resolved   • Hypokalemia [E87.6] 11/01/2016 07/22/2017       Presenting Problem/History of Present Illness  Colitis [K52.9]  Nausea and vomiting, intractability of vomiting not specified, unspecified vomiting type [R11.2]     This is a 89-year-old white female with history of dementia, atrial fibrillation, diastolic congestive heart failure, coronary artery disease who presented to the emergency room today with nausea, vomiting, and diarrhea.  She lives alone and has home instead services that come to see her.  Her caregiver came to her house this evening and discovered a shunt laying in bed in her own feces and thus brought her to the emergency room.  Evidently the patient has been having intermittent abdominal cramping over the past couple of weeks and some isolated episodes of diarrhea but it is much worse now per her caregiver.  The patient has not taken any of her medications in the last 2-3 weeks.  She has been more confused over the past 2 days.  Upon evaluation in the emergency room she was found to have acute colitis.  Unfortunately, I'm not able to get much more information even the patient's dementia.    Hospital Course  The patient is a 89 y.o. female who presented with viral colitis. She was admitted and required IV resuscitation. Her diarrhea improved with supportive care and off of antibiotics. Her QTc was prolonged and this improved with stopping prolonging  medications. QTc was 469 at discharge. She was intermittently bracycardic on her home atenolol dose of 25mg BID. It has been decreased to 12.5mg BID and most recent HR was 88. She will be discharged today to SNF with stopping of her home diuretic. She should continue her ISMN and donepezil.    Exam Today:  Constitutional: She appears well-developed. She is cooperative. She appears ill. No distress.   Frail   HENT:   Head: Normocephalic and atraumatic.   Eyes: EOM are normal. Pupils are equal, round, and reactive to light.   Neck: Normal range of motion. Neck supple.   Cardiovascular: Normal rate, regular rhythm, normal heart sounds and intact distal pulses.    No murmur heard.  Pulmonary/Chest: Effort normal and breath sounds normal. She has no wheezes.   Abdominal: Soft. Normal appearance and bowel sounds are normal. She exhibits no distension. There is no tenderness.   Musculoskeletal: Normal range of motion. She exhibits no edema.   Neurological: She is alert. No cranial nerve deficit.   Skin: Skin is warm and dry. She is not diaphoretic.   Psychiatric: She has a normal mood and affect. Her behavior is normal.    Procedures Performed:         Consults:   Consults     Date and Time Order Name Status Description    7/20/2017 2230 [facility] (on-call MD unless specified) Completed            Discharge Disposition  Skilled Nursing Facility (DC - External)    Discharge Medications:   Annika Audrey   Home Medication Instructions CYNTHIA:[id number]    Printed on:07/24/17 1612   Medication Information                      apixaban (ELIQUIS) 2.5 MG tablet tablet  Take 1 tablet by mouth Every 12 (Twelve) Hours.             aspirin 81 MG chewable tablet  Chew 81 mg Daily As Needed for Mild Pain .             atenolol (TENORMIN) 25 MG tablet  Take 0.5 tablets by mouth 2 (Two) Times a Day.             donepezil (ARICEPT) 5 MG tablet  Take 5 mg by mouth Every Night.             isosorbide mononitrate (IMDUR) 30 MG 24 hr tablet  Take  30 mg by mouth Daily.             metFORMIN (GLUCOPHAGE) 500 MG tablet  Take 500 mg by mouth 2 (Two) Times a Day With Meals.             SIMVASTATIN PO  Take 10 mg by mouth Daily.                 Discharge Diet:   Diet Instructions     Advance Diet As Tolerated                     Activity at Discharge:   Activity Instructions     Activity as Tolerated                     Follow-up Appointments:  No future appointments.  Additional Instructions for the Follow-ups that You Need to Schedule     Discharge Follow-up with PCP    As directed    Follow Up Details:  per facility protocol                 Test Results Pending at Discharge   Order Current Status    Blood Culture Preliminary result    Blood Culture Preliminary result           Skip Goldsmith MD  07/24/17  4:12 PM    Time Spent on Discharge Activities: >30 minutes

## 2017-07-24 NOTE — PROGRESS NOTES
Continued Stay Note  [facility]     Patient Name: Audrey Roblero  MRN: [id number]  Today's Date: 7/24/2017    Admit Date: 7/20/2017          Discharge Plan       07/24/17 1631    Case Management/Social Work Plan    Plan [facility]    Patient/Family In Agreement With Plan yes    Additional Comments S/W dtr, eBcky and she will take patient to facility.  S/W Gemma/Signature and patient's sonViet is filling out the paperwork. Notified Gemma/Signature of plan.  Rell, RN, CCP     Final Note    Final Note orders to d/c.              Discharge Codes     None        Expected Discharge Date and Time     Expected Discharge Date Expected Discharge Time    Jul 24, 2017             Denise Maldonado RN

## 2017-07-25 LAB — BACTERIA SPEC AEROBE CULT: NORMAL

## 2017-07-25 NOTE — PROGRESS NOTES
Continued Stay Note  [facility]     Patient Name: Audrey Roblero  MRN: [id number]  Today's Date: 7/25/2017    Admit Date: 7/20/2017          Discharge Plan       07/25/17 0651    Case Management/Social Work Plan    Plan [facility]    Final Note    Final Note taken by dtr              Discharge Codes       07/25/17 0651    Discharge Codes    Discharge Codes 03  Discharged/transferred to skilled nursing facility (SNF) with Medicare certification in anticipation of skilled care        Expected Discharge Date and Time     Expected Discharge Date Expected Discharge Time    Jul 24, 2017             Denise Maldonado RN

## 2017-07-26 LAB — BACTERIA SPEC AEROBE CULT: NORMAL

## 2017-11-22 ENCOUNTER — APPOINTMENT (OUTPATIENT)
Dept: GENERAL RADIOLOGY | Facility: HOSPITAL | Age: 82
End: 2017-11-22

## 2017-11-22 ENCOUNTER — HOSPITAL ENCOUNTER (INPATIENT)
Facility: HOSPITAL | Age: 82
LOS: 2 days | Discharge: HOME | End: 2017-11-24
Attending: EMERGENCY MEDICINE | Admitting: INTERNAL MEDICINE

## 2017-11-22 DIAGNOSIS — R41.82 ALTERED MENTAL STATUS, UNSPECIFIED ALTERED MENTAL STATUS TYPE: ICD-10-CM

## 2017-11-22 DIAGNOSIS — J81.0 ACUTE PULMONARY EDEMA (HCC): Primary | ICD-10-CM

## 2017-11-22 PROBLEM — E11.8 TYPE 2 DIABETES MELLITUS WITH COMPLICATION (HCC): Status: ACTIVE | Noted: 2017-11-22

## 2017-11-22 PROBLEM — J96.01 ACUTE RESPIRATORY FAILURE WITH HYPOXIA AND HYPERCAPNIA (HCC): Status: ACTIVE | Noted: 2017-11-22

## 2017-11-22 PROBLEM — Z66 DNR (DO NOT RESUSCITATE): Status: ACTIVE | Noted: 2017-11-22

## 2017-11-22 PROBLEM — J96.02 ACUTE RESPIRATORY FAILURE WITH HYPOXIA AND HYPERCAPNIA (HCC): Status: ACTIVE | Noted: 2017-11-22

## 2017-11-22 LAB
ALBUMIN SERPL-MCNC: 4 G/DL (ref 3.5–5.2)
ALBUMIN/GLOB SERPL: 1.3 G/DL
ALP SERPL-CCNC: 122 U/L (ref 39–117)
ALT SERPL W P-5'-P-CCNC: 27 U/L (ref 1–33)
ANION GAP SERPL CALCULATED.3IONS-SCNC: 16.3 MMOL/L
ARTERIAL PATENCY WRIST A: POSITIVE
AST SERPL-CCNC: 42 U/L (ref 1–32)
ATMOSPHERIC PRESS: 757 MMHG
B PERT DNA SPEC QL NAA+PROBE: NOT DETECTED
BASE EXCESS BLDA CALC-SCNC: -3.2 MMOL/L (ref 0–2)
BASOPHILS # BLD AUTO: 0.01 10*3/MM3 (ref 0–0.2)
BASOPHILS # BLD AUTO: 0.05 10*3/MM3 (ref 0–0.2)
BASOPHILS NFR BLD AUTO: 0.1 % (ref 0–1.5)
BASOPHILS NFR BLD AUTO: 0.5 % (ref 0–1.5)
BDY SITE: ABNORMAL
BILIRUB SERPL-MCNC: 0.4 MG/DL (ref 0.1–1.2)
BUN BLD-MCNC: 19 MG/DL (ref 8–23)
BUN/CREAT SERPL: 23.8 (ref 7–25)
C PNEUM DNA NPH QL NAA+NON-PROBE: NOT DETECTED
CALCIUM SPEC-SCNC: 9.2 MG/DL (ref 8.2–9.6)
CHLORIDE SERPL-SCNC: 93 MMOL/L (ref 98–107)
CO2 SERPL-SCNC: 21.7 MMOL/L (ref 22–29)
CREAT BLD-MCNC: 0.8 MG/DL (ref 0.57–1)
DEPRECATED RDW RBC AUTO: 52.9 FL (ref 37–54)
DEPRECATED RDW RBC AUTO: 54.6 FL (ref 37–54)
EOSINOPHIL # BLD AUTO: 0.01 10*3/MM3 (ref 0–0.7)
EOSINOPHIL # BLD AUTO: 0.28 10*3/MM3 (ref 0–0.7)
EOSINOPHIL NFR BLD AUTO: 0.1 % (ref 0.3–6.2)
EOSINOPHIL NFR BLD AUTO: 2.8 % (ref 0.3–6.2)
ERYTHROCYTE [DISTWIDTH] IN BLOOD BY AUTOMATED COUNT: 14.8 % (ref 11.7–13)
ERYTHROCYTE [DISTWIDTH] IN BLOOD BY AUTOMATED COUNT: 15.2 % (ref 11.7–13)
FLUAV H1 2009 PAND RNA NPH QL NAA+PROBE: NOT DETECTED
FLUAV H1 HA GENE NPH QL NAA+PROBE: NOT DETECTED
FLUAV H3 RNA NPH QL NAA+PROBE: NOT DETECTED
FLUAV SUBTYP SPEC NAA+PROBE: NOT DETECTED
FLUBV RNA ISLT QL NAA+PROBE: NOT DETECTED
GAS FLOW AIRWAY: 15 LPM
GFR SERPL CREATININE-BSD FRML MDRD: 66 ML/MIN/1.73
GLOBULIN UR ELPH-MCNC: 3 GM/DL
GLUCOSE BLD-MCNC: 276 MG/DL (ref 65–99)
GLUCOSE BLDC GLUCOMTR-MCNC: 153 MG/DL (ref 70–130)
GLUCOSE BLDC GLUCOMTR-MCNC: 92 MG/DL (ref 70–130)
HADV DNA SPEC NAA+PROBE: NOT DETECTED
HCO3 BLDA-SCNC: 24.6 MMOL/L (ref 22–28)
HCOV 229E RNA SPEC QL NAA+PROBE: NOT DETECTED
HCOV HKU1 RNA SPEC QL NAA+PROBE: NOT DETECTED
HCOV NL63 RNA SPEC QL NAA+PROBE: NOT DETECTED
HCOV OC43 RNA SPEC QL NAA+PROBE: NOT DETECTED
HCT VFR BLD AUTO: 39.3 % (ref 35.6–45.5)
HCT VFR BLD AUTO: 41.1 % (ref 35.6–45.5)
HGB BLD-MCNC: 12.5 G/DL (ref 11.9–15.5)
HGB BLD-MCNC: 13.1 G/DL (ref 11.9–15.5)
HMPV RNA NPH QL NAA+NON-PROBE: NOT DETECTED
HOLD SPECIMEN: NORMAL
HOLD SPECIMEN: NORMAL
HPIV1 RNA SPEC QL NAA+PROBE: NOT DETECTED
HPIV2 RNA SPEC QL NAA+PROBE: NOT DETECTED
HPIV3 RNA NPH QL NAA+PROBE: NOT DETECTED
HPIV4 P GENE NPH QL NAA+PROBE: NOT DETECTED
IMM GRANULOCYTES # BLD: 0 10*3/MM3 (ref 0–0.03)
IMM GRANULOCYTES # BLD: 0.03 10*3/MM3 (ref 0–0.03)
IMM GRANULOCYTES NFR BLD: 0 % (ref 0–0.5)
IMM GRANULOCYTES NFR BLD: 0.3 % (ref 0–0.5)
LYMPHOCYTES # BLD AUTO: 0.45 10*3/MM3 (ref 0.9–4.8)
LYMPHOCYTES # BLD AUTO: 4.02 10*3/MM3 (ref 0.9–4.8)
LYMPHOCYTES NFR BLD AUTO: 3.9 % (ref 19.6–45.3)
LYMPHOCYTES NFR BLD AUTO: 39.8 % (ref 19.6–45.3)
M PNEUMO IGG SER IA-ACNC: NOT DETECTED
MCH RBC QN AUTO: 30.8 PG (ref 26.9–32)
MCH RBC QN AUTO: 31.1 PG (ref 26.9–32)
MCHC RBC AUTO-ENTMCNC: 31.8 G/DL (ref 32.4–36.3)
MCHC RBC AUTO-ENTMCNC: 31.9 G/DL (ref 32.4–36.3)
MCV RBC AUTO: 96.8 FL (ref 80.5–98.2)
MCV RBC AUTO: 97.6 FL (ref 80.5–98.2)
MODALITY: ABNORMAL
MONOCYTES # BLD AUTO: 0.76 10*3/MM3 (ref 0.2–1.2)
MONOCYTES # BLD AUTO: 0.77 10*3/MM3 (ref 0.2–1.2)
MONOCYTES NFR BLD AUTO: 6.6 % (ref 5–12)
MONOCYTES NFR BLD AUTO: 7.6 % (ref 5–12)
NEUTROPHILS # BLD AUTO: 10.33 10*3/MM3 (ref 1.9–8.1)
NEUTROPHILS # BLD AUTO: 4.99 10*3/MM3 (ref 1.9–8.1)
NEUTROPHILS NFR BLD AUTO: 49.3 % (ref 42.7–76)
NEUTROPHILS NFR BLD AUTO: 89 % (ref 42.7–76)
NT-PROBNP SERPL-MCNC: 7193 PG/ML (ref 0–1800)
PCO2 BLDA: 53.7 MM HG (ref 35–45)
PH BLDA: 7.27 PH UNITS (ref 7.35–7.45)
PLATELET # BLD AUTO: 188 10*3/MM3 (ref 140–500)
PLATELET # BLD AUTO: 247 10*3/MM3 (ref 140–500)
PMV BLD AUTO: 10 FL (ref 6–12)
PMV BLD AUTO: 9.8 FL (ref 6–12)
PO2 BLDA: 79.5 MM HG (ref 80–100)
POTASSIUM BLD-SCNC: 4.1 MMOL/L (ref 3.5–5.2)
PROCALCITONIN SERPL-MCNC: 0.1 NG/ML (ref 0.1–0.25)
PROT SERPL-MCNC: 7 G/DL (ref 6–8.5)
RBC # BLD AUTO: 4.06 10*6/MM3 (ref 3.9–5.2)
RBC # BLD AUTO: 4.21 10*6/MM3 (ref 3.9–5.2)
RHINOVIRUS RNA SPEC NAA+PROBE: NOT DETECTED
RSV RNA NPH QL NAA+NON-PROBE: NOT DETECTED
SAO2 % BLDCOA: 93.5 % (ref 92–99)
SET MECH RESP RATE: 24
SODIUM BLD-SCNC: 131 MMOL/L (ref 136–145)
TOTAL RATE: 24 BREATHS/MINUTE
TROPONIN T SERPL-MCNC: 0.02 NG/ML (ref 0–0.03)
TROPONIN T SERPL-MCNC: <0.01 NG/ML (ref 0–0.03)
WBC NRBC COR # BLD: 10.11 10*3/MM3 (ref 4.5–10.7)
WBC NRBC COR # BLD: 11.59 10*3/MM3 (ref 4.5–10.7)
WHOLE BLOOD HOLD SPECIMEN: NORMAL
WHOLE BLOOD HOLD SPECIMEN: NORMAL

## 2017-11-22 PROCEDURE — 87040 BLOOD CULTURE FOR BACTERIA: CPT | Performed by: EMERGENCY MEDICINE

## 2017-11-22 PROCEDURE — 36415 COLL VENOUS BLD VENIPUNCTURE: CPT | Performed by: EMERGENCY MEDICINE

## 2017-11-22 PROCEDURE — 99285 EMERGENCY DEPT VISIT HI MDM: CPT

## 2017-11-22 PROCEDURE — 94640 AIRWAY INHALATION TREATMENT: CPT

## 2017-11-22 PROCEDURE — 84145 PROCALCITONIN (PCT): CPT | Performed by: EMERGENCY MEDICINE

## 2017-11-22 PROCEDURE — 82803 BLOOD GASES ANY COMBINATION: CPT

## 2017-11-22 PROCEDURE — 87581 M.PNEUMON DNA AMP PROBE: CPT | Performed by: INTERNAL MEDICINE

## 2017-11-22 PROCEDURE — 25010000002 FUROSEMIDE PER 20 MG: Performed by: EMERGENCY MEDICINE

## 2017-11-22 PROCEDURE — 93005 ELECTROCARDIOGRAM TRACING: CPT | Performed by: EMERGENCY MEDICINE

## 2017-11-22 PROCEDURE — 71010 HC CHEST PA OR AP: CPT

## 2017-11-22 PROCEDURE — 82962 GLUCOSE BLOOD TEST: CPT

## 2017-11-22 PROCEDURE — 84484 ASSAY OF TROPONIN QUANT: CPT | Performed by: EMERGENCY MEDICINE

## 2017-11-22 PROCEDURE — 94799 UNLISTED PULMONARY SVC/PX: CPT

## 2017-11-22 PROCEDURE — 25010000002 FUROSEMIDE PER 20 MG: Performed by: INTERNAL MEDICINE

## 2017-11-22 PROCEDURE — 83880 ASSAY OF NATRIURETIC PEPTIDE: CPT | Performed by: EMERGENCY MEDICINE

## 2017-11-22 PROCEDURE — 93010 ELECTROCARDIOGRAM REPORT: CPT | Performed by: INTERNAL MEDICINE

## 2017-11-22 PROCEDURE — 36600 WITHDRAWAL OF ARTERIAL BLOOD: CPT

## 2017-11-22 PROCEDURE — 87633 RESP VIRUS 12-25 TARGETS: CPT | Performed by: INTERNAL MEDICINE

## 2017-11-22 PROCEDURE — 87798 DETECT AGENT NOS DNA AMP: CPT | Performed by: INTERNAL MEDICINE

## 2017-11-22 PROCEDURE — 85025 COMPLETE CBC W/AUTO DIFF WBC: CPT | Performed by: EMERGENCY MEDICINE

## 2017-11-22 PROCEDURE — 80053 COMPREHEN METABOLIC PANEL: CPT | Performed by: EMERGENCY MEDICINE

## 2017-11-22 PROCEDURE — 25010000002 ONDANSETRON PER 1 MG: Performed by: EMERGENCY MEDICINE

## 2017-11-22 PROCEDURE — 63710000001 INSULIN ASPART PER 5 UNITS: Performed by: INTERNAL MEDICINE

## 2017-11-22 PROCEDURE — 87486 CHLMYD PNEUM DNA AMP PROBE: CPT | Performed by: INTERNAL MEDICINE

## 2017-11-22 PROCEDURE — 84484 ASSAY OF TROPONIN QUANT: CPT | Performed by: INTERNAL MEDICINE

## 2017-11-22 RX ORDER — FUROSEMIDE 10 MG/ML
40 INJECTION INTRAMUSCULAR; INTRAVENOUS 2 TIMES DAILY
Status: DISCONTINUED | OUTPATIENT
Start: 2017-11-22 | End: 2017-11-23

## 2017-11-22 RX ORDER — ASPIRIN 81 MG/1
81 TABLET, CHEWABLE ORAL DAILY PRN
Status: DISCONTINUED | OUTPATIENT
Start: 2017-11-22 | End: 2017-11-22

## 2017-11-22 RX ORDER — IPRATROPIUM BROMIDE AND ALBUTEROL SULFATE 2.5; .5 MG/3ML; MG/3ML
3 SOLUTION RESPIRATORY (INHALATION)
Status: DISCONTINUED | OUTPATIENT
Start: 2017-11-22 | End: 2017-11-23

## 2017-11-22 RX ORDER — ONDANSETRON 2 MG/ML
4 INJECTION INTRAMUSCULAR; INTRAVENOUS ONCE
Status: COMPLETED | OUTPATIENT
Start: 2017-11-22 | End: 2017-11-22

## 2017-11-22 RX ORDER — ISOSORBIDE MONONITRATE 30 MG/1
30 TABLET, EXTENDED RELEASE ORAL DAILY
Status: DISCONTINUED | OUTPATIENT
Start: 2017-11-22 | End: 2017-11-24 | Stop reason: HOSPADM

## 2017-11-22 RX ORDER — ASPIRIN 81 MG/1
81 TABLET, CHEWABLE ORAL DAILY
Status: DISCONTINUED | OUTPATIENT
Start: 2017-11-22 | End: 2017-11-24 | Stop reason: HOSPADM

## 2017-11-22 RX ORDER — NICOTINE POLACRILEX 4 MG
15 LOZENGE BUCCAL
Status: DISCONTINUED | OUTPATIENT
Start: 2017-11-22 | End: 2017-11-24 | Stop reason: HOSPADM

## 2017-11-22 RX ORDER — ALBUTEROL SULFATE 2.5 MG/3ML
2.5 SOLUTION RESPIRATORY (INHALATION) EVERY 6 HOURS PRN
Status: DISCONTINUED | OUTPATIENT
Start: 2017-11-22 | End: 2017-11-23

## 2017-11-22 RX ORDER — ATENOLOL 25 MG/1
12.5 TABLET ORAL 2 TIMES DAILY
Status: DISCONTINUED | OUTPATIENT
Start: 2017-11-22 | End: 2017-11-24 | Stop reason: HOSPADM

## 2017-11-22 RX ORDER — ATORVASTATIN CALCIUM 10 MG/1
10 TABLET, FILM COATED ORAL DAILY
Status: DISCONTINUED | OUTPATIENT
Start: 2017-11-22 | End: 2017-11-24 | Stop reason: HOSPADM

## 2017-11-22 RX ORDER — FUROSEMIDE 10 MG/ML
40 INJECTION INTRAMUSCULAR; INTRAVENOUS ONCE
Status: COMPLETED | OUTPATIENT
Start: 2017-11-22 | End: 2017-11-22

## 2017-11-22 RX ORDER — DONEPEZIL HYDROCHLORIDE 5 MG/1
5 TABLET, FILM COATED ORAL NIGHTLY
Status: DISCONTINUED | OUTPATIENT
Start: 2017-11-22 | End: 2017-11-24 | Stop reason: HOSPADM

## 2017-11-22 RX ORDER — DEXTROSE MONOHYDRATE 25 G/50ML
25 INJECTION, SOLUTION INTRAVENOUS
Status: DISCONTINUED | OUTPATIENT
Start: 2017-11-22 | End: 2017-11-24 | Stop reason: HOSPADM

## 2017-11-22 RX ORDER — FUROSEMIDE 10 MG/ML
40 INJECTION INTRAMUSCULAR; INTRAVENOUS DAILY
COMMUNITY
End: 2017-11-24 | Stop reason: HOSPADM

## 2017-11-22 RX ORDER — SODIUM CHLORIDE 0.9 % (FLUSH) 0.9 %
1-10 SYRINGE (ML) INJECTION AS NEEDED
Status: DISCONTINUED | OUTPATIENT
Start: 2017-11-22 | End: 2017-11-24 | Stop reason: HOSPADM

## 2017-11-22 RX ORDER — ALBUTEROL SULFATE 2.5 MG/3ML
2.5 SOLUTION RESPIRATORY (INHALATION) EVERY 6 HOURS PRN
Status: DISCONTINUED | OUTPATIENT
Start: 2017-11-22 | End: 2017-11-22

## 2017-11-22 RX ADMIN — ATORVASTATIN CALCIUM 10 MG: 10 TABLET, FILM COATED ORAL at 18:06

## 2017-11-22 RX ADMIN — ISOSORBIDE MONONITRATE 30 MG: 30 TABLET ORAL at 18:06

## 2017-11-22 RX ADMIN — ATENOLOL 12.5 MG: 25 TABLET ORAL at 18:05

## 2017-11-22 RX ADMIN — IPRATROPIUM BROMIDE AND ALBUTEROL SULFATE 3 ML: .5; 3 SOLUTION RESPIRATORY (INHALATION) at 19:10

## 2017-11-22 RX ADMIN — FUROSEMIDE 40 MG: 10 INJECTION, SOLUTION INTRAMUSCULAR; INTRAVENOUS at 18:06

## 2017-11-22 RX ADMIN — ASPIRIN 81 MG: 81 TABLET, CHEWABLE ORAL at 18:06

## 2017-11-22 RX ADMIN — DONEPEZIL HYDROCHLORIDE 5 MG: 5 TABLET, FILM COATED ORAL at 23:22

## 2017-11-22 RX ADMIN — APIXABAN 2.5 MG: 2.5 TABLET, FILM COATED ORAL at 23:22

## 2017-11-22 RX ADMIN — INSULIN ASPART 2 UNITS: 100 INJECTION, SOLUTION INTRAVENOUS; SUBCUTANEOUS at 18:11

## 2017-11-22 RX ADMIN — NITROGLYCERIN 1 INCH: 20 OINTMENT TOPICAL at 10:04

## 2017-11-22 RX ADMIN — ONDANSETRON 4 MG: 2 INJECTION INTRAMUSCULAR; INTRAVENOUS at 09:04

## 2017-11-22 RX ADMIN — FUROSEMIDE 40 MG: 10 INJECTION, SOLUTION INTRAMUSCULAR; INTRAVENOUS at 09:41

## 2017-11-23 PROBLEM — F03.90 DEMENTIA WITHOUT BEHAVIORAL DISTURBANCE (HCC): Status: ACTIVE | Noted: 2017-11-23

## 2017-11-23 LAB
ANION GAP SERPL CALCULATED.3IONS-SCNC: 9.6 MMOL/L
BUN BLD-MCNC: 20 MG/DL (ref 8–23)
BUN/CREAT SERPL: 24.4 (ref 7–25)
CALCIUM SPEC-SCNC: 8.6 MG/DL (ref 8.2–9.6)
CHLORIDE SERPL-SCNC: 93 MMOL/L (ref 98–107)
CO2 SERPL-SCNC: 29.4 MMOL/L (ref 22–29)
CREAT BLD-MCNC: 0.82 MG/DL (ref 0.57–1)
DEPRECATED RDW RBC AUTO: 53.5 FL (ref 37–54)
ERYTHROCYTE [DISTWIDTH] IN BLOOD BY AUTOMATED COUNT: 15.1 % (ref 11.7–13)
GFR SERPL CREATININE-BSD FRML MDRD: 65 ML/MIN/1.73
GLUCOSE BLD-MCNC: 114 MG/DL (ref 65–99)
GLUCOSE BLDC GLUCOMTR-MCNC: 117 MG/DL (ref 70–130)
GLUCOSE BLDC GLUCOMTR-MCNC: 138 MG/DL (ref 70–130)
GLUCOSE BLDC GLUCOMTR-MCNC: 180 MG/DL (ref 70–130)
GLUCOSE BLDC GLUCOMTR-MCNC: 187 MG/DL (ref 70–130)
HBA1C MFR BLD: 5.59 % (ref 4.8–5.6)
HCT VFR BLD AUTO: 33.1 % (ref 35.6–45.5)
HGB BLD-MCNC: 10.7 G/DL (ref 11.9–15.5)
MAGNESIUM SERPL-MCNC: 1.7 MG/DL (ref 1.7–2.3)
MCH RBC QN AUTO: 31.2 PG (ref 26.9–32)
MCHC RBC AUTO-ENTMCNC: 32.3 G/DL (ref 32.4–36.3)
MCV RBC AUTO: 96.5 FL (ref 80.5–98.2)
PLATELET # BLD AUTO: 178 10*3/MM3 (ref 140–500)
PMV BLD AUTO: 9.8 FL (ref 6–12)
POTASSIUM BLD-SCNC: 4.1 MMOL/L (ref 3.5–5.2)
RBC # BLD AUTO: 3.43 10*6/MM3 (ref 3.9–5.2)
SODIUM BLD-SCNC: 132 MMOL/L (ref 136–145)
TROPONIN T SERPL-MCNC: 0.01 NG/ML (ref 0–0.03)
WBC NRBC COR # BLD: 7.63 10*3/MM3 (ref 4.5–10.7)

## 2017-11-23 PROCEDURE — 82962 GLUCOSE BLOOD TEST: CPT

## 2017-11-23 PROCEDURE — 83735 ASSAY OF MAGNESIUM: CPT | Performed by: INTERNAL MEDICINE

## 2017-11-23 PROCEDURE — 93010 ELECTROCARDIOGRAM REPORT: CPT | Performed by: INTERNAL MEDICINE

## 2017-11-23 PROCEDURE — 80048 BASIC METABOLIC PNL TOTAL CA: CPT | Performed by: INTERNAL MEDICINE

## 2017-11-23 PROCEDURE — 83036 HEMOGLOBIN GLYCOSYLATED A1C: CPT | Performed by: INTERNAL MEDICINE

## 2017-11-23 PROCEDURE — 85027 COMPLETE CBC AUTOMATED: CPT | Performed by: INTERNAL MEDICINE

## 2017-11-23 PROCEDURE — 84484 ASSAY OF TROPONIN QUANT: CPT | Performed by: INTERNAL MEDICINE

## 2017-11-23 PROCEDURE — 94799 UNLISTED PULMONARY SVC/PX: CPT

## 2017-11-23 PROCEDURE — 93005 ELECTROCARDIOGRAM TRACING: CPT | Performed by: INTERNAL MEDICINE

## 2017-11-23 RX ORDER — ACETAMINOPHEN 325 MG/1
650 TABLET ORAL EVERY 6 HOURS PRN
Status: DISCONTINUED | OUTPATIENT
Start: 2017-11-23 | End: 2017-11-24 | Stop reason: HOSPADM

## 2017-11-23 RX ORDER — FUROSEMIDE 40 MG/1
40 TABLET ORAL DAILY
Status: DISCONTINUED | OUTPATIENT
Start: 2017-11-23 | End: 2017-11-24 | Stop reason: HOSPADM

## 2017-11-23 RX ORDER — POTASSIUM CHLORIDE 750 MG/1
40 CAPSULE, EXTENDED RELEASE ORAL AS NEEDED
Status: DISCONTINUED | OUTPATIENT
Start: 2017-11-23 | End: 2017-11-24 | Stop reason: HOSPADM

## 2017-11-23 RX ORDER — MAGNESIUM OXIDE 400 MG/1
400 TABLET ORAL DAILY
Status: DISCONTINUED | OUTPATIENT
Start: 2017-11-23 | End: 2017-11-24 | Stop reason: HOSPADM

## 2017-11-23 RX ORDER — POTASSIUM CHLORIDE 1.5 G/1.77G
40 POWDER, FOR SOLUTION ORAL AS NEEDED
Status: DISCONTINUED | OUTPATIENT
Start: 2017-11-23 | End: 2017-11-24 | Stop reason: HOSPADM

## 2017-11-23 RX ORDER — IPRATROPIUM BROMIDE AND ALBUTEROL SULFATE 2.5; .5 MG/3ML; MG/3ML
3 SOLUTION RESPIRATORY (INHALATION) EVERY 6 HOURS PRN
Status: DISCONTINUED | OUTPATIENT
Start: 2017-11-23 | End: 2017-11-24 | Stop reason: HOSPADM

## 2017-11-23 RX ADMIN — ATENOLOL 12.5 MG: 25 TABLET ORAL at 17:16

## 2017-11-23 RX ADMIN — APIXABAN 2.5 MG: 2.5 TABLET, FILM COATED ORAL at 08:38

## 2017-11-23 RX ADMIN — APIXABAN 2.5 MG: 2.5 TABLET, FILM COATED ORAL at 21:13

## 2017-11-23 RX ADMIN — FUROSEMIDE 40 MG: 40 TABLET ORAL at 13:26

## 2017-11-23 RX ADMIN — Medication 400 MG: at 13:26

## 2017-11-23 RX ADMIN — DONEPEZIL HYDROCHLORIDE 5 MG: 5 TABLET, FILM COATED ORAL at 21:13

## 2017-11-23 RX ADMIN — ATORVASTATIN CALCIUM 10 MG: 10 TABLET, FILM COATED ORAL at 08:38

## 2017-11-23 RX ADMIN — ATENOLOL 12.5 MG: 25 TABLET ORAL at 08:39

## 2017-11-23 RX ADMIN — ISOSORBIDE MONONITRATE 30 MG: 30 TABLET ORAL at 08:38

## 2017-11-23 RX ADMIN — ASPIRIN 81 MG: 81 TABLET, CHEWABLE ORAL at 08:39

## 2017-11-24 VITALS
OXYGEN SATURATION: 95 % | BODY MASS INDEX: 15.75 KG/M2 | HEART RATE: 62 BPM | TEMPERATURE: 97.6 F | RESPIRATION RATE: 18 BRPM | DIASTOLIC BLOOD PRESSURE: 55 MMHG | HEIGHT: 70 IN | SYSTOLIC BLOOD PRESSURE: 140 MMHG | WEIGHT: 110 LBS

## 2017-11-24 PROBLEM — J81.0 ACUTE PULMONARY EDEMA (HCC): Status: RESOLVED | Noted: 2017-11-22 | Resolved: 2017-11-24

## 2017-11-24 PROBLEM — J96.02 ACUTE RESPIRATORY FAILURE WITH HYPOXIA AND HYPERCAPNIA (HCC): Status: RESOLVED | Noted: 2017-11-22 | Resolved: 2017-11-24

## 2017-11-24 PROBLEM — Z51.5 PALLIATIVE CARE PATIENT: Status: ACTIVE | Noted: 2017-11-24

## 2017-11-24 PROBLEM — J96.01 ACUTE RESPIRATORY FAILURE WITH HYPOXIA AND HYPERCAPNIA (HCC): Status: RESOLVED | Noted: 2017-11-22 | Resolved: 2017-11-24

## 2017-11-24 LAB
ANION GAP SERPL CALCULATED.3IONS-SCNC: 13.4 MMOL/L
BUN BLD-MCNC: 18 MG/DL (ref 8–23)
BUN/CREAT SERPL: 22.2 (ref 7–25)
CALCIUM SPEC-SCNC: 8.3 MG/DL (ref 8.2–9.6)
CHLORIDE SERPL-SCNC: 94 MMOL/L (ref 98–107)
CO2 SERPL-SCNC: 24.6 MMOL/L (ref 22–29)
CREAT BLD-MCNC: 0.81 MG/DL (ref 0.57–1)
DEPRECATED RDW RBC AUTO: 56.4 FL (ref 37–54)
ERYTHROCYTE [DISTWIDTH] IN BLOOD BY AUTOMATED COUNT: 15.5 % (ref 11.7–13)
GFR SERPL CREATININE-BSD FRML MDRD: 65 ML/MIN/1.73
GLUCOSE BLD-MCNC: 150 MG/DL (ref 65–99)
GLUCOSE BLDC GLUCOMTR-MCNC: 132 MG/DL (ref 70–130)
HCT VFR BLD AUTO: 33.3 % (ref 35.6–45.5)
HGB BLD-MCNC: 10.3 G/DL (ref 11.9–15.5)
MAGNESIUM SERPL-MCNC: 2 MG/DL (ref 1.7–2.3)
MCH RBC QN AUTO: 30.8 PG (ref 26.9–32)
MCHC RBC AUTO-ENTMCNC: 30.9 G/DL (ref 32.4–36.3)
MCV RBC AUTO: 99.7 FL (ref 80.5–98.2)
PLATELET # BLD AUTO: 196 10*3/MM3 (ref 140–500)
PMV BLD AUTO: 10 FL (ref 6–12)
POTASSIUM BLD-SCNC: 3.9 MMOL/L (ref 3.5–5.2)
RBC # BLD AUTO: 3.34 10*6/MM3 (ref 3.9–5.2)
SODIUM BLD-SCNC: 132 MMOL/L (ref 136–145)
URATE SERPL-MCNC: 6 MG/DL (ref 2.4–5.7)
WBC NRBC COR # BLD: 8.28 10*3/MM3 (ref 4.5–10.7)

## 2017-11-24 PROCEDURE — 83735 ASSAY OF MAGNESIUM: CPT | Performed by: INTERNAL MEDICINE

## 2017-11-24 PROCEDURE — 84550 ASSAY OF BLOOD/URIC ACID: CPT | Performed by: INTERNAL MEDICINE

## 2017-11-24 PROCEDURE — 85027 COMPLETE CBC AUTOMATED: CPT | Performed by: INTERNAL MEDICINE

## 2017-11-24 PROCEDURE — 80048 BASIC METABOLIC PNL TOTAL CA: CPT | Performed by: INTERNAL MEDICINE

## 2017-11-24 PROCEDURE — 82962 GLUCOSE BLOOD TEST: CPT

## 2017-11-24 RX ORDER — OLANZAPINE 10 MG/1
10 TABLET ORAL NIGHTLY PRN
Qty: 30 TABLET | Refills: 0 | Status: SHIPPED | OUTPATIENT
Start: 2017-11-24

## 2017-11-24 RX ORDER — FUROSEMIDE 40 MG/1
40 TABLET ORAL DAILY
Qty: 30 TABLET | Refills: 0 | Status: SHIPPED | OUTPATIENT
Start: 2017-11-25

## 2017-11-24 RX ADMIN — FUROSEMIDE 40 MG: 40 TABLET ORAL at 08:46

## 2017-11-24 RX ADMIN — Medication 400 MG: at 08:46

## 2017-11-24 RX ADMIN — ATENOLOL 12.5 MG: 25 TABLET ORAL at 08:46

## 2017-11-24 RX ADMIN — APIXABAN 2.5 MG: 2.5 TABLET, FILM COATED ORAL at 08:46

## 2017-11-24 RX ADMIN — ATORVASTATIN CALCIUM 10 MG: 10 TABLET, FILM COATED ORAL at 08:46

## 2017-11-24 RX ADMIN — ASPIRIN 81 MG: 81 TABLET, CHEWABLE ORAL at 08:46

## 2017-11-24 RX ADMIN — ISOSORBIDE MONONITRATE 30 MG: 30 TABLET ORAL at 08:46

## 2017-11-27 LAB
BACTERIA SPEC AEROBE CULT: NORMAL
BACTERIA SPEC AEROBE CULT: NORMAL
GLUCOSE BLDC GLUCOMTR-MCNC: 162 MG/DL (ref 70–130)